# Patient Record
Sex: MALE | Race: WHITE | NOT HISPANIC OR LATINO | Employment: FULL TIME | URBAN - METROPOLITAN AREA
[De-identification: names, ages, dates, MRNs, and addresses within clinical notes are randomized per-mention and may not be internally consistent; named-entity substitution may affect disease eponyms.]

---

## 2017-01-05 RX ORDER — PREGABALIN 225 MG/1
225 CAPSULE ORAL 3 TIMES DAILY
Qty: 90 CAPSULE | Refills: 5 | Status: SHIPPED | OUTPATIENT
Start: 2017-01-05 | End: 2017-07-20 | Stop reason: SDUPTHER

## 2017-01-05 NOTE — TELEPHONE ENCOUNTER
I spoke with the pt he is going to fill out his contract when he picks up his RX  Aldair harvey and in you folder

## 2017-01-25 DIAGNOSIS — F32.A DEPRESSION, UNSPECIFIED DEPRESSION TYPE: ICD-10-CM

## 2017-01-25 RX ORDER — DULOXETIN HYDROCHLORIDE 60 MG/1
60 CAPSULE, DELAYED RELEASE ORAL DAILY
Qty: 90 CAPSULE | Refills: 3 | Status: SHIPPED | OUTPATIENT
Start: 2017-01-25 | End: 2017-12-19 | Stop reason: SDUPTHER

## 2017-02-10 RX ORDER — ATORVASTATIN CALCIUM 20 MG/1
TABLET, FILM COATED ORAL
Qty: 90 TABLET | Refills: 0 | Status: SHIPPED | OUTPATIENT
Start: 2017-02-10 | End: 2017-05-09 | Stop reason: SDUPTHER

## 2017-03-02 ENCOUNTER — OFFICE VISIT (OUTPATIENT)
Dept: INTERNAL MEDICINE | Facility: CLINIC | Age: 52
End: 2017-03-02

## 2017-03-02 VITALS
RESPIRATION RATE: 16 BRPM | BODY MASS INDEX: 32.04 KG/M2 | TEMPERATURE: 97 F | OXYGEN SATURATION: 96 % | SYSTOLIC BLOOD PRESSURE: 130 MMHG | HEART RATE: 106 BPM | WEIGHT: 216.3 LBS | DIASTOLIC BLOOD PRESSURE: 90 MMHG | HEIGHT: 69 IN

## 2017-03-02 DIAGNOSIS — H10.31 ACUTE CONJUNCTIVITIS OF RIGHT EYE, UNSPECIFIED ACUTE CONJUNCTIVITIS TYPE: Primary | ICD-10-CM

## 2017-03-02 PROCEDURE — 99213 OFFICE O/P EST LOW 20 MIN: CPT | Performed by: NURSE PRACTITIONER

## 2017-03-02 RX ORDER — POLYMYXIN B SULFATE AND TRIMETHOPRIM 1; 10000 MG/ML; [USP'U]/ML
1 SOLUTION OPHTHALMIC EVERY 4 HOURS
Qty: 10 EACH | Refills: 0 | Status: SHIPPED | OUTPATIENT
Start: 2017-03-02 | End: 2017-03-14

## 2017-03-02 NOTE — PROGRESS NOTES
Vitals:    03/02/17 1107   BP: 130/90   Pulse: 106   Resp: 16   Temp: 97 °F (36.1 °C)   SpO2: 96%     Last 2 weights    03/02/17  1107   Weight: 216 lb 4.8 oz (98.1 kg)     Social History   Substance Use Topics   • Smoking status: Never Smoker   • Smokeless tobacco: None   • Alcohol use Yes      Comment: rarely       Subjective     HPI  Pt presents to office today with possible pink eye. He states symptoms started about two days ago where he woke up feeling like his eyes were matted, watery, and itchy. When he looked in his mirror he saw his right eye was pretty red and it has progressively gotten worse. Pt also admits to have a cough with some congestion prior to his eye looking like this. His vision gets temporarily blurry due to the increase in lacrimation and thick discharge. Pt denies any eye pain, constant vision change. He has been using some antihistamine drops to try to relief the itching which has helped some but the other symptoms are still present.    The following portions of the patient's history were reviewed and updated as appropriate: allergies, current medications, past medical history, past social history and problem list.    Review of Systems   Constitutional: Negative.    Eyes: Positive for discharge, redness and itching.        Increase in lacrimation, matted eye in morning   Respiratory: Negative.    Cardiovascular: Negative.        Objective     Physical Exam   Constitutional: He is oriented to person, place, and time. He appears well-developed and well-nourished.   HENT:   Head: Normocephalic and atraumatic.   Eyes: EOM and lids are normal. Pupils are equal, round, and reactive to light. Right eye exhibits discharge. Right conjunctiva is injected.   Pt inner left eye exhibits slight redness as well but not completely injected at this time   Neck: Normal range of motion.   Cardiovascular: Normal rate, regular rhythm and normal heart sounds.    Pulmonary/Chest: Effort normal and breath sounds  normal.   Musculoskeletal: Normal range of motion.   Neurological: He is alert and oriented to person, place, and time.   Nursing note and vitals reviewed.      Assessment/Plan   Brady was seen today for conjunctivitis.    Diagnoses and all orders for this visit:    Acute conjunctivitis of right eye, unspecified acute conjunctivitis type    Other orders  -     trimethoprim-polymyxin b (POLYTRIM) 45948-3.1 UNIT/ML-% ophthalmic solution; Administer 1 drop to the right eye Every 4 (Four) Hours.         -Advised pt if left eye starts to become more red to start treating both eyes instead of just right  -good handwashing skills to help prevent the spread to other  -cont home meds  -FU prn or if symptoms persist/worsen

## 2017-03-14 ENCOUNTER — OFFICE VISIT (OUTPATIENT)
Dept: INTERNAL MEDICINE | Facility: CLINIC | Age: 52
End: 2017-03-14

## 2017-03-14 VITALS
DIASTOLIC BLOOD PRESSURE: 62 MMHG | HEART RATE: 116 BPM | RESPIRATION RATE: 16 BRPM | TEMPERATURE: 98.9 F | SYSTOLIC BLOOD PRESSURE: 100 MMHG | BODY MASS INDEX: 31.6 KG/M2 | WEIGHT: 214 LBS

## 2017-03-14 DIAGNOSIS — E78.2 MIXED HYPERLIPIDEMIA: ICD-10-CM

## 2017-03-14 DIAGNOSIS — E11.9 CONTROLLED TYPE 2 DIABETES MELLITUS WITHOUT COMPLICATION, WITHOUT LONG-TERM CURRENT USE OF INSULIN (HCC): ICD-10-CM

## 2017-03-14 DIAGNOSIS — R00.0 TACHYCARDIA WITH HEART RATE 100-120 BEATS PER MINUTE: Primary | ICD-10-CM

## 2017-03-14 DIAGNOSIS — G62.9 PERIPHERAL POLYNEUROPATHY: ICD-10-CM

## 2017-03-14 PROCEDURE — 99214 OFFICE O/P EST MOD 30 MIN: CPT | Performed by: FAMILY MEDICINE

## 2017-03-14 NOTE — PROGRESS NOTES
Subjective   Brady Mota is a 51 y.o. male.     Chief Complaint   Patient presents with   • Diabetes   • Rapid Heart Rate   • Hyperlipidemia         History of Present Illness   Patient returns with history of diabetes type 2 now on metformin 500 mg daily.  His Misbah A1c went up to 10.  He is on atorvastatin as well as Lyrica high-dose 3 times a day for peripheral polyneuropathy.  He has recurrent tachycardia and we discussed follow-up with cardiology as far as his resting tachycardia.  Prior labs in regards to hyperlipidemia reviewed.    The following portions of the patient's history were reviewed and updated as appropriate: allergies, current medications, past social history and problem list.    Review of Systems   Constitutional: Negative.    HENT: Negative.    Eyes: Negative.    Respiratory: Negative.    Cardiovascular: Positive for palpitations.   Gastrointestinal: Negative.    Endocrine: Negative.    Genitourinary: Negative.    Musculoskeletal: Positive for myalgias.   Skin: Negative.    Allergic/Immunologic: Negative.    Neurological: Positive for numbness.   Hematological: Negative.    Psychiatric/Behavioral: Negative.        Objective   Vitals:    03/14/17 1714   BP: 100/62   Pulse: 116   Resp: 16   Temp: 98.9 °F (37.2 °C)     Physical Exam   Constitutional: He is oriented to person, place, and time. He appears well-developed and well-nourished.   HENT:   Head: Normocephalic and atraumatic.   Right Ear: Tympanic membrane and external ear normal.   Left Ear: Tympanic membrane and external ear normal.   Nose: Nose normal.   Mouth/Throat: Oropharynx is clear and moist.   Eyes: Conjunctivae and EOM are normal. Pupils are equal, round, and reactive to light.   Neck: Normal range of motion. Neck supple. No JVD present. No thyromegaly present.   Cardiovascular: Regular rhythm, normal heart sounds and intact distal pulses.  Tachycardia present.    Pulmonary/Chest: Effort normal and breath sounds normal.    Abdominal: Soft. Bowel sounds are normal.   Musculoskeletal: Normal range of motion.   Lymphadenopathy:     He has no cervical adenopathy.   Neurological: He is alert and oriented to person, place, and time. No cranial nerve deficit. Coordination normal.   Skin: Skin is warm and dry. No rash noted.   Psychiatric: He has a normal mood and affect. His behavior is normal. Judgment and thought content normal.   Vitals reviewed.      Assessment/Plan   Problem List Items Addressed This Visit        Cardiovascular and Mediastinum    Hyperlipidemia       Nervous and Auditory    Peripheral neuropathy      Other Visit Diagnoses     Tachycardia with heart rate 100-120 beats per minute    -  Primary    Relevant Orders    Ambulatory Referral to Cardiology    Hemoglobin A1c    Comprehensive Metabolic Panel    Controlled type 2 diabetes mellitus without complication, without long-term current use of insulin        Relevant Medications    metFORMIN (GLUCOPHAGE) 500 MG tablet    Other Relevant Orders    Hemoglobin A1c    Comprehensive Metabolic Panel       increase metformin 500 twice a day.  Recheck labs in 3 months.  Follow-up then.  Referral to cardiology for evaluation of tachycardia.  Continue atorvastatin.  Continue current dose of Lyrica

## 2017-05-09 RX ORDER — ATORVASTATIN CALCIUM 20 MG/1
20 TABLET, FILM COATED ORAL DAILY
Qty: 90 TABLET | Refills: 1 | Status: SHIPPED | OUTPATIENT
Start: 2017-05-09 | End: 2017-11-25 | Stop reason: SDUPTHER

## 2017-05-24 ENCOUNTER — LAB (OUTPATIENT)
Dept: INTERNAL MEDICINE | Facility: CLINIC | Age: 52
End: 2017-05-24

## 2017-05-24 DIAGNOSIS — R00.0 TACHYCARDIA WITH HEART RATE 100-120 BEATS PER MINUTE: ICD-10-CM

## 2017-05-24 DIAGNOSIS — E11.9 CONTROLLED TYPE 2 DIABETES MELLITUS WITHOUT COMPLICATION, WITHOUT LONG-TERM CURRENT USE OF INSULIN (HCC): ICD-10-CM

## 2017-05-25 LAB
ALBUMIN SERPL-MCNC: 4.2 G/DL (ref 3.5–5.2)
ALBUMIN/GLOB SERPL: 1.3 G/DL
ALP SERPL-CCNC: 107 U/L (ref 39–117)
ALT SERPL-CCNC: 175 U/L (ref 1–41)
AST SERPL-CCNC: 192 U/L (ref 1–40)
BILIRUB SERPL-MCNC: 0.5 MG/DL (ref 0.1–1.2)
BUN SERPL-MCNC: 9 MG/DL (ref 6–20)
BUN/CREAT SERPL: 12.2 (ref 7–25)
CALCIUM SERPL-MCNC: 9.1 MG/DL (ref 8.6–10.5)
CHLORIDE SERPL-SCNC: 96 MMOL/L (ref 98–107)
CO2 SERPL-SCNC: 24.4 MMOL/L (ref 22–29)
CREAT SERPL-MCNC: 0.74 MG/DL (ref 0.76–1.27)
GLOBULIN SER CALC-MCNC: 3.2 GM/DL
GLUCOSE SERPL-MCNC: 182 MG/DL (ref 65–99)
HBA1C MFR BLD: 10.5 % (ref 4.8–5.6)
POTASSIUM SERPL-SCNC: 3.9 MMOL/L (ref 3.5–5.2)
PROT SERPL-MCNC: 7.4 G/DL (ref 6–8.5)
SODIUM SERPL-SCNC: 138 MMOL/L (ref 136–145)

## 2017-07-21 RX ORDER — PREGABALIN 225 MG/1
CAPSULE ORAL
Qty: 90 CAPSULE | Refills: 5 | Status: SHIPPED | OUTPATIENT
Start: 2017-07-21 | End: 2017-12-19 | Stop reason: SDUPTHER

## 2017-08-01 ENCOUNTER — OFFICE VISIT (OUTPATIENT)
Dept: INTERNAL MEDICINE | Facility: CLINIC | Age: 52
End: 2017-08-01

## 2017-08-01 VITALS
BODY MASS INDEX: 31.6 KG/M2 | WEIGHT: 214 LBS | DIASTOLIC BLOOD PRESSURE: 88 MMHG | HEART RATE: 113 BPM | OXYGEN SATURATION: 96 % | TEMPERATURE: 97.6 F | SYSTOLIC BLOOD PRESSURE: 128 MMHG

## 2017-08-01 DIAGNOSIS — G62.9 PERIPHERAL POLYNEUROPATHY: ICD-10-CM

## 2017-08-01 DIAGNOSIS — E78.2 MIXED HYPERLIPIDEMIA: Primary | ICD-10-CM

## 2017-08-01 DIAGNOSIS — R74.8 ELEVATED LIVER ENZYMES: ICD-10-CM

## 2017-08-01 DIAGNOSIS — IMO0001 UNCONTROLLED TYPE 2 DIABETES MELLITUS WITHOUT COMPLICATION, WITHOUT LONG-TERM CURRENT USE OF INSULIN: ICD-10-CM

## 2017-08-01 PROCEDURE — 99214 OFFICE O/P EST MOD 30 MIN: CPT | Performed by: FAMILY MEDICINE

## 2017-08-01 RX ORDER — METFORMIN HYDROCHLORIDE EXTENDED-RELEASE TABLETS 1000 MG/1
1000 TABLET, FILM COATED, EXTENDED RELEASE ORAL
Qty: 90 TABLET | Refills: 3 | Status: SHIPPED | OUTPATIENT
Start: 2017-08-01 | End: 2018-02-19 | Stop reason: SDUPTHER

## 2017-08-01 NOTE — PROGRESS NOTES
Subjective   Brady Mota is a 52 y.o. male.     Chief Complaint   Patient presents with   • Diabetes   • Hypertension   • Hyperlipidemia         History of Present Illness   Returns for recheck with a number of risk factors.  Misbah A1c was 10.5 and he has changed his diet to the point of not drinking soft drinks and is now accepted taking metformin 500 twice a day but forgets the second dose quite frequently we discussed using extended-release metformin which will give.  Otherwise preceding this he had evidence of an idiopathic progressive polyneuropathy which is painful requiring him to be on Lyrica.  This caused weight gain however exacerbating his diabetes type 2.  He also has elevated liver enzymes.      The following portions of the patient's history were reviewed and updated as appropriate: allergies, current medications, past social history and problem list.    Review of Systems   Constitutional: Negative.    HENT: Negative.    Eyes: Negative.    Respiratory: Negative.    Cardiovascular: Negative.    Gastrointestinal: Negative.    Endocrine: Negative.    Genitourinary: Negative.    Musculoskeletal: Negative.    Skin: Negative.    Allergic/Immunologic: Negative.    Neurological: Negative.    Hematological: Negative.    Psychiatric/Behavioral: Negative.        Objective   Vitals:    08/01/17 1632   BP: 128/88   Pulse: 113   Temp: 97.6 °F (36.4 °C)   SpO2: 96%     Physical Exam   Constitutional: He is oriented to person, place, and time. He appears well-developed and well-nourished.   HENT:   Head: Normocephalic and atraumatic.   Right Ear: Tympanic membrane and external ear normal.   Left Ear: Tympanic membrane and external ear normal.   Nose: Nose normal.   Mouth/Throat: Oropharynx is clear and moist.   Eyes: Conjunctivae and EOM are normal. Pupils are equal, round, and reactive to light.   Neck: Normal range of motion. Neck supple. No JVD present. No thyromegaly present.   Cardiovascular: Normal rate,  regular rhythm, normal heart sounds and intact distal pulses.    Pulmonary/Chest: Effort normal and breath sounds normal.   Abdominal: Soft. Bowel sounds are normal.   Musculoskeletal: Normal range of motion.   Lymphadenopathy:     He has no cervical adenopathy.   Neurological: He is alert and oriented to person, place, and time. No cranial nerve deficit. Coordination normal.   Skin: Skin is warm and dry. No rash noted.   Psychiatric: He has a normal mood and affect. His behavior is normal. Judgment and thought content normal.   Vitals reviewed.      Assessment/Plan   Problem List Items Addressed This Visit        Cardiovascular and Mediastinum    Hyperlipidemia - Primary    Relevant Orders    CBC & Differential    Comprehensive Metabolic Panel    Hemoglobin A1c    Lipid Panel With / Chol / HDL Ratio    MicroAlbumin, Urine, Random       Nervous and Auditory    Peripheral neuropathy      Other Visit Diagnoses     Uncontrolled type 2 diabetes mellitus without complication, without long-term current use of insulin        Relevant Medications    metFORMIN (FORTAMET) 1000 MG (OSM) 24 hr tablet    Other Relevant Orders    CBC & Differential    Comprehensive Metabolic Panel    Hemoglobin A1c    Lipid Panel With / Chol / HDL Ratio    MicroAlbumin, Urine, Random    Elevated liver enzymes          Plan: Metformin changed to metformin extended release 24,000 mg once daily.  He'll return for screening labs.  Other medications are continued.  He is a cardiovascular workup for the rapid heartbeat and apparently that is a nonpathologic situation.

## 2017-11-27 RX ORDER — ATORVASTATIN CALCIUM 20 MG/1
TABLET, FILM COATED ORAL
Qty: 90 TABLET | Refills: 1 | Status: SHIPPED | OUTPATIENT
Start: 2017-11-27 | End: 2018-02-22 | Stop reason: SDUPTHER

## 2017-12-01 ENCOUNTER — RESULTS ENCOUNTER (OUTPATIENT)
Dept: INTERNAL MEDICINE | Facility: CLINIC | Age: 52
End: 2017-12-01

## 2017-12-01 DIAGNOSIS — E78.2 MIXED HYPERLIPIDEMIA: ICD-10-CM

## 2017-12-01 DIAGNOSIS — IMO0001 UNCONTROLLED TYPE 2 DIABETES MELLITUS WITHOUT COMPLICATION, WITHOUT LONG-TERM CURRENT USE OF INSULIN: ICD-10-CM

## 2017-12-12 LAB
ALBUMIN SERPL-MCNC: 4.7 G/DL (ref 3.5–5.2)
ALBUMIN/GLOB SERPL: 1.5 G/DL
ALP SERPL-CCNC: 99 U/L (ref 39–117)
ALT SERPL-CCNC: 47 U/L (ref 1–41)
AST SERPL-CCNC: 44 U/L (ref 1–40)
BASOPHILS # BLD AUTO: 0.02 10*3/MM3 (ref 0–0.2)
BASOPHILS NFR BLD AUTO: 0.3 % (ref 0–1.5)
BILIRUB SERPL-MCNC: 0.5 MG/DL (ref 0.1–1.2)
BUN SERPL-MCNC: 14 MG/DL (ref 6–20)
BUN/CREAT SERPL: 16.5 (ref 7–25)
CALCIUM SERPL-MCNC: 9.5 MG/DL (ref 8.6–10.5)
CHLORIDE SERPL-SCNC: 98 MMOL/L (ref 98–107)
CHOLEST SERPL-MCNC: 188 MG/DL (ref 0–200)
CHOLEST/HDLC SERPL: 5.53 {RATIO}
CO2 SERPL-SCNC: 27.2 MMOL/L (ref 22–29)
CREAT SERPL-MCNC: 0.85 MG/DL (ref 0.76–1.27)
EOSINOPHIL # BLD AUTO: 0.23 10*3/MM3 (ref 0–0.7)
EOSINOPHIL NFR BLD AUTO: 3.4 % (ref 0.3–6.2)
ERYTHROCYTE [DISTWIDTH] IN BLOOD BY AUTOMATED COUNT: 12.7 % (ref 11.5–14.5)
GFR SERPLBLD CREATININE-BSD FMLA CKD-EPI: 115 ML/MIN/1.73
GFR SERPLBLD CREATININE-BSD FMLA CKD-EPI: 95 ML/MIN/1.73
GLOBULIN SER CALC-MCNC: 3.2 GM/DL
GLUCOSE SERPL-MCNC: 113 MG/DL (ref 65–99)
HBA1C MFR BLD: 6.53 % (ref 4.8–5.6)
HCT VFR BLD AUTO: 51.5 % (ref 40.4–52.2)
HDLC SERPL-MCNC: 34 MG/DL (ref 40–60)
HGB BLD-MCNC: 17.8 G/DL (ref 13.7–17.6)
IMM GRANULOCYTES # BLD: 0 10*3/MM3 (ref 0–0.03)
IMM GRANULOCYTES NFR BLD: 0 % (ref 0–0.5)
LDLC SERPL CALC-MCNC: 102 MG/DL (ref 0–100)
LYMPHOCYTES # BLD AUTO: 2.02 10*3/MM3 (ref 0.9–4.8)
LYMPHOCYTES NFR BLD AUTO: 29.9 % (ref 19.6–45.3)
MCH RBC QN AUTO: 32.2 PG (ref 27–32.7)
MCHC RBC AUTO-ENTMCNC: 34.6 G/DL (ref 32.6–36.4)
MCV RBC AUTO: 93.3 FL (ref 79.8–96.2)
MONOCYTES # BLD AUTO: 0.49 10*3/MM3 (ref 0.2–1.2)
MONOCYTES NFR BLD AUTO: 7.2 % (ref 5–12)
NEUTROPHILS # BLD AUTO: 4 10*3/MM3 (ref 1.9–8.1)
NEUTROPHILS NFR BLD AUTO: 59.2 % (ref 42.7–76)
PLATELET # BLD AUTO: 192 10*3/MM3 (ref 140–500)
POTASSIUM SERPL-SCNC: 4.9 MMOL/L (ref 3.5–5.2)
PROT SERPL-MCNC: 7.9 G/DL (ref 6–8.5)
RBC # BLD AUTO: 5.52 10*6/MM3 (ref 4.6–6)
SODIUM SERPL-SCNC: 143 MMOL/L (ref 136–145)
TRIGL SERPL-MCNC: 260 MG/DL (ref 0–150)
VLDLC SERPL CALC-MCNC: 52 MG/DL (ref 5–40)
WBC # BLD AUTO: 6.76 10*3/MM3 (ref 4.5–10.7)

## 2017-12-19 ENCOUNTER — OFFICE VISIT (OUTPATIENT)
Dept: INTERNAL MEDICINE | Facility: CLINIC | Age: 52
End: 2017-12-19

## 2017-12-19 VITALS
BODY MASS INDEX: 31.01 KG/M2 | HEART RATE: 109 BPM | TEMPERATURE: 97.7 F | OXYGEN SATURATION: 97 % | DIASTOLIC BLOOD PRESSURE: 80 MMHG | WEIGHT: 210 LBS | SYSTOLIC BLOOD PRESSURE: 112 MMHG

## 2017-12-19 DIAGNOSIS — E78.2 MIXED HYPERLIPIDEMIA: Primary | ICD-10-CM

## 2017-12-19 DIAGNOSIS — F32.A DEPRESSION, UNSPECIFIED DEPRESSION TYPE: ICD-10-CM

## 2017-12-19 DIAGNOSIS — G62.9 PERIPHERAL POLYNEUROPATHY: ICD-10-CM

## 2017-12-19 DIAGNOSIS — IMO0001 UNCONTROLLED TYPE 2 DIABETES MELLITUS WITHOUT COMPLICATION, WITHOUT LONG-TERM CURRENT USE OF INSULIN: ICD-10-CM

## 2017-12-19 PROCEDURE — 99214 OFFICE O/P EST MOD 30 MIN: CPT | Performed by: FAMILY MEDICINE

## 2017-12-19 RX ORDER — DULOXETIN HYDROCHLORIDE 60 MG/1
60 CAPSULE, DELAYED RELEASE ORAL DAILY
Qty: 90 CAPSULE | Refills: 3 | Status: SHIPPED | OUTPATIENT
Start: 2017-12-19 | End: 2018-02-22 | Stop reason: SDUPTHER

## 2017-12-19 RX ORDER — PREGABALIN 225 MG/1
225 CAPSULE ORAL DAILY
Qty: 90 CAPSULE | Refills: 5 | Status: SHIPPED | OUTPATIENT
Start: 2017-12-19 | End: 2018-02-01 | Stop reason: SDUPTHER

## 2017-12-19 NOTE — PROGRESS NOTES
Subjective   Brady Mota is a 52 y.o. male.     Chief Complaint   Patient presents with   • Hyperlipidemia   • Hypertension   • GI Problem   • Diabetes         History of Present Illness   Patient with type 2 diabetes and diabetic neuropathy with progressive neuropathy being treated by podiatry for an ulceration on the left foot this with Dr. Thomas.  His labs are markedly better after getting off regular Coca-Cola 4 times a day.  He is now Coca-Cola 0.    History of hypertension intention hyperlipidemia are reviewed as well as diet and diabetes.  His liver functions were improved.      The following portions of the patient's history were reviewed and updated as appropriate: allergies, current medications, past social history and problem list.    Review of Systems   Constitutional: Negative.    HENT: Negative.    Eyes: Negative.    Respiratory: Negative.    Cardiovascular: Negative.    Gastrointestinal: Negative.    Endocrine: Negative.    Genitourinary: Negative.    Musculoskeletal: Negative.    Skin: Negative.    Allergic/Immunologic: Negative.    Neurological: Negative.    Hematological: Negative.    Psychiatric/Behavioral: Negative.        Objective   Vitals:    12/19/17 1645   BP: 112/80   Pulse: 109   Temp: 97.7 °F (36.5 °C)   SpO2: 97%     Physical Exam   Constitutional: He is oriented to person, place, and time. He appears well-developed and well-nourished.   HENT:   Head: Normocephalic and atraumatic.   Right Ear: Tympanic membrane and external ear normal.   Left Ear: Tympanic membrane and external ear normal.   Nose: Nose normal.   Mouth/Throat: Oropharynx is clear and moist.   Eyes: Conjunctivae and EOM are normal. Pupils are equal, round, and reactive to light.   Neck: Normal range of motion. Neck supple. No JVD present. No thyromegaly present.   Cardiovascular: Normal rate, regular rhythm, normal heart sounds and intact distal pulses.    Pulmonary/Chest: Effort normal and breath sounds normal.    Abdominal: Soft. Bowel sounds are normal.   Musculoskeletal: Normal range of motion.   Lymphadenopathy:     He has no cervical adenopathy.   Neurological: He is alert and oriented to person, place, and time. A sensory deficit is present. No cranial nerve deficit. Coordination normal.   Skin: Skin is warm and dry. No rash noted.   Psychiatric: He has a normal mood and affect. His behavior is normal. Judgment and thought content normal.   Vitals reviewed.      Assessment/Plan   Problem List Items Addressed This Visit        Cardiovascular and Mediastinum    Hyperlipidemia - Primary    Relevant Orders    CBC & Differential    Comprehensive Metabolic Panel    Hemoglobin A1c    Lipid Panel With / Chol / HDL Ratio    Urinalysis With / Microscopic If Indicated - Urine, Clean Catch    Vitamin B12    MicroAlbumin, Urine, Random - Urine, Clean Catch       Endocrine    Uncontrolled type 2 diabetes mellitus without complication, without long-term current use of insulin    Relevant Orders    CBC & Differential    Comprehensive Metabolic Panel    Hemoglobin A1c    Lipid Panel With / Chol / HDL Ratio    Urinalysis With / Microscopic If Indicated - Urine, Clean Catch    Vitamin B12    MicroAlbumin, Urine, Random - Urine, Clean Catch       Nervous and Auditory    Peripheral neuropathy    Relevant Orders    CBC & Differential    Comprehensive Metabolic Panel    Hemoglobin A1c    Lipid Panel With / Chol / HDL Ratio    Urinalysis With / Microscopic If Indicated - Urine, Clean Catch    Vitamin B12    MicroAlbumin, Urine, Random - Urine, Clean Catch      Other Visit Diagnoses     Depression, unspecified depression type        Relevant Medications    DULoxetine (CYMBALTA) 60 MG capsule      Plan: Meds remain the same.  Recheck labs 6 months.  Continue diet as he is done with improvement.  Follow-up with podiatry.

## 2018-02-01 ENCOUNTER — TELEPHONE (OUTPATIENT)
Dept: INTERNAL MEDICINE | Facility: CLINIC | Age: 53
End: 2018-02-01

## 2018-02-01 RX ORDER — PREGABALIN 225 MG/1
225 CAPSULE ORAL 3 TIMES DAILY
Qty: 90 CAPSULE | Refills: 5 | Status: SHIPPED | OUTPATIENT
Start: 2018-02-01 | End: 2018-06-19 | Stop reason: SDUPTHER

## 2018-02-01 NOTE — TELEPHONE ENCOUNTER
He is correct.  Please change his current Lyrica dose to 220 mg 3 times a day.  He will need a new prescription.

## 2018-02-19 RX ORDER — METFORMIN HYDROCHLORIDE EXTENDED-RELEASE TABLETS 1000 MG/1
1000 TABLET, FILM COATED, EXTENDED RELEASE ORAL
Qty: 90 TABLET | Refills: 1 | Status: SHIPPED | OUTPATIENT
Start: 2018-02-19 | End: 2018-02-22 | Stop reason: CLARIF

## 2018-02-22 DIAGNOSIS — F32.A DEPRESSION, UNSPECIFIED DEPRESSION TYPE: ICD-10-CM

## 2018-02-22 RX ORDER — ATORVASTATIN CALCIUM 20 MG/1
20 TABLET, FILM COATED ORAL NIGHTLY
Qty: 90 TABLET | Refills: 1 | Status: SHIPPED | OUTPATIENT
Start: 2018-02-22 | End: 2018-02-26 | Stop reason: SDUPTHER

## 2018-02-22 RX ORDER — METFORMIN HYDROCHLORIDE 500 MG/1
500 TABLET, EXTENDED RELEASE ORAL 2 TIMES DAILY
Qty: 180 TABLET | Refills: 1 | Status: SHIPPED | OUTPATIENT
Start: 2018-02-22 | End: 2018-02-26 | Stop reason: SDUPTHER

## 2018-02-22 RX ORDER — DULOXETIN HYDROCHLORIDE 60 MG/1
60 CAPSULE, DELAYED RELEASE ORAL DAILY
Qty: 90 CAPSULE | Refills: 1 | Status: SHIPPED | OUTPATIENT
Start: 2018-02-22 | End: 2018-02-26 | Stop reason: SDUPTHER

## 2018-02-22 RX ORDER — METFORMIN HYDROCHLORIDE 1000 MG/1
1000 TABLET, FILM COATED, EXTENDED RELEASE ORAL
Qty: 90 TABLET | Refills: 1 | Status: SHIPPED | OUTPATIENT
Start: 2018-02-22 | End: 2018-02-22 | Stop reason: DRUGHIGH

## 2018-02-26 DIAGNOSIS — F32.A DEPRESSION, UNSPECIFIED DEPRESSION TYPE: ICD-10-CM

## 2018-02-26 RX ORDER — ATORVASTATIN CALCIUM 20 MG/1
20 TABLET, FILM COATED ORAL NIGHTLY
Qty: 90 TABLET | Refills: 1 | Status: SHIPPED | OUTPATIENT
Start: 2018-02-26 | End: 2018-08-24 | Stop reason: SDUPTHER

## 2018-02-26 RX ORDER — METFORMIN HYDROCHLORIDE 500 MG/1
500 TABLET, EXTENDED RELEASE ORAL 2 TIMES DAILY
Qty: 180 TABLET | Refills: 1 | Status: SHIPPED | OUTPATIENT
Start: 2018-02-26 | End: 2018-08-31 | Stop reason: SDUPTHER

## 2018-02-26 RX ORDER — DULOXETIN HYDROCHLORIDE 60 MG/1
60 CAPSULE, DELAYED RELEASE ORAL DAILY
Qty: 90 CAPSULE | Refills: 1 | Status: SHIPPED | OUTPATIENT
Start: 2018-02-26 | End: 2018-08-31 | Stop reason: SDUPTHER

## 2018-06-01 ENCOUNTER — RESULTS ENCOUNTER (OUTPATIENT)
Dept: INTERNAL MEDICINE | Facility: CLINIC | Age: 53
End: 2018-06-01

## 2018-06-01 DIAGNOSIS — G62.9 PERIPHERAL POLYNEUROPATHY: ICD-10-CM

## 2018-06-01 DIAGNOSIS — IMO0001 UNCONTROLLED TYPE 2 DIABETES MELLITUS WITHOUT COMPLICATION, WITHOUT LONG-TERM CURRENT USE OF INSULIN: ICD-10-CM

## 2018-06-01 DIAGNOSIS — E78.2 MIXED HYPERLIPIDEMIA: ICD-10-CM

## 2018-06-12 LAB
ALBUMIN SERPL-MCNC: 4.4 G/DL (ref 3.5–5.2)
ALBUMIN/GLOB SERPL: 1.6 G/DL
ALP SERPL-CCNC: 97 U/L (ref 39–117)
ALT SERPL-CCNC: 24 U/L (ref 1–41)
AST SERPL-CCNC: 20 U/L (ref 1–40)
BASOPHILS # BLD AUTO: 0.02 10*3/MM3 (ref 0–0.2)
BASOPHILS NFR BLD AUTO: 0.3 % (ref 0–1.5)
BILIRUB SERPL-MCNC: 0.5 MG/DL (ref 0.1–1.2)
BUN SERPL-MCNC: 14 MG/DL (ref 6–20)
BUN/CREAT SERPL: 17.3 (ref 7–25)
CALCIUM SERPL-MCNC: 9.5 MG/DL (ref 8.6–10.5)
CHLORIDE SERPL-SCNC: 96 MMOL/L (ref 98–107)
CHOLEST SERPL-MCNC: 175 MG/DL (ref 0–200)
CHOLEST/HDLC SERPL: 5 {RATIO}
CO2 SERPL-SCNC: 26.5 MMOL/L (ref 22–29)
CREAT SERPL-MCNC: 0.81 MG/DL (ref 0.76–1.27)
EOSINOPHIL # BLD AUTO: 0.24 10*3/MM3 (ref 0–0.7)
EOSINOPHIL NFR BLD AUTO: 3.6 % (ref 0.3–6.2)
ERYTHROCYTE [DISTWIDTH] IN BLOOD BY AUTOMATED COUNT: 12.6 % (ref 11.5–14.5)
GFR SERPLBLD CREATININE-BSD FMLA CKD-EPI: 100 ML/MIN/1.73
GFR SERPLBLD CREATININE-BSD FMLA CKD-EPI: 121 ML/MIN/1.73
GLOBULIN SER CALC-MCNC: 2.8 GM/DL
GLUCOSE SERPL-MCNC: 116 MG/DL (ref 65–99)
HBA1C MFR BLD: 6.56 % (ref 4.8–5.6)
HCT VFR BLD AUTO: 50.9 % (ref 40.4–52.2)
HDLC SERPL-MCNC: 35 MG/DL (ref 40–60)
HGB BLD-MCNC: 17.2 G/DL (ref 13.7–17.6)
IMM GRANULOCYTES # BLD: 0.01 10*3/MM3 (ref 0–0.03)
IMM GRANULOCYTES NFR BLD: 0.2 % (ref 0–0.5)
LDLC SERPL CALC-MCNC: 98 MG/DL (ref 0–100)
LYMPHOCYTES # BLD AUTO: 1.57 10*3/MM3 (ref 0.9–4.8)
LYMPHOCYTES NFR BLD AUTO: 23.6 % (ref 19.6–45.3)
MCH RBC QN AUTO: 30.7 PG (ref 27–32.7)
MCHC RBC AUTO-ENTMCNC: 33.8 G/DL (ref 32.6–36.4)
MCV RBC AUTO: 90.7 FL (ref 79.8–96.2)
MONOCYTES # BLD AUTO: 0.5 10*3/MM3 (ref 0.2–1.2)
MONOCYTES NFR BLD AUTO: 7.5 % (ref 5–12)
NEUTROPHILS # BLD AUTO: 4.33 10*3/MM3 (ref 1.9–8.1)
NEUTROPHILS NFR BLD AUTO: 65 % (ref 42.7–76)
PLATELET # BLD AUTO: 170 10*3/MM3 (ref 140–500)
POTASSIUM SERPL-SCNC: 4.9 MMOL/L (ref 3.5–5.2)
PROT SERPL-MCNC: 7.2 G/DL (ref 6–8.5)
RBC # BLD AUTO: 5.61 10*6/MM3 (ref 4.6–6)
SODIUM SERPL-SCNC: 139 MMOL/L (ref 136–145)
TRIGL SERPL-MCNC: 212 MG/DL (ref 0–150)
UNABLE TO VOID: NORMAL
VIT B12 SERPL-MCNC: 583 PG/ML (ref 211–946)
VLDLC SERPL CALC-MCNC: 42.4 MG/DL (ref 5–40)
WBC # BLD AUTO: 6.66 10*3/MM3 (ref 4.5–10.7)

## 2018-06-19 ENCOUNTER — OFFICE VISIT (OUTPATIENT)
Dept: INTERNAL MEDICINE | Facility: CLINIC | Age: 53
End: 2018-06-19

## 2018-06-19 VITALS
OXYGEN SATURATION: 97 % | HEART RATE: 100 BPM | BODY MASS INDEX: 31.6 KG/M2 | TEMPERATURE: 97.1 F | SYSTOLIC BLOOD PRESSURE: 128 MMHG | DIASTOLIC BLOOD PRESSURE: 88 MMHG | WEIGHT: 214 LBS

## 2018-06-19 DIAGNOSIS — E78.2 MIXED HYPERLIPIDEMIA: ICD-10-CM

## 2018-06-19 DIAGNOSIS — IMO0001 UNCONTROLLED TYPE 2 DIABETES MELLITUS WITHOUT COMPLICATION, WITHOUT LONG-TERM CURRENT USE OF INSULIN: Primary | ICD-10-CM

## 2018-06-19 DIAGNOSIS — G59 MONONEUROPATHY DUE TO UNDERLYING DISEASE: ICD-10-CM

## 2018-06-19 PROCEDURE — 99214 OFFICE O/P EST MOD 30 MIN: CPT | Performed by: FAMILY MEDICINE

## 2018-06-19 RX ORDER — PREGABALIN 225 MG/1
225 CAPSULE ORAL 3 TIMES DAILY
Qty: 90 CAPSULE | Refills: 5 | Status: SHIPPED | OUTPATIENT
Start: 2018-06-19 | End: 2018-12-22 | Stop reason: SDUPTHER

## 2018-06-19 RX ORDER — FLUTICASONE PROPIONATE 50 MCG
2 SPRAY, SUSPENSION (ML) NASAL DAILY
Qty: 48 G | Refills: 3 | Status: SHIPPED | OUTPATIENT
Start: 2018-06-19 | End: 2021-04-19 | Stop reason: SDUPTHER

## 2018-06-19 NOTE — PROGRESS NOTES
Subjective   Brady Mota is a 52 y.o. male.     Chief Complaint   Patient presents with   • Diabetes   • Hyperlipidemia   Neuropathy  History of Present Illness   Amol is here for recheck of labs for type 2 diabetes hypertriglyceridemia hyperlipidemia.  He is overall improved in all this and stopping Coca-Cola and starting metformin.  His progress is remarkable.  He is trying to do overall better.  He has a slowly healing ulcer on the right foot which is treated by surgery at [facility].  This is doing better as well.  It sounds like his neuropathy is improved.  Labs are reviewed with him and results are given to him.      The following portions of the patient's history were reviewed and updated as appropriate: allergies, current medications, past social history and problem list.    Review of Systems   Constitutional: Negative.    HENT: Negative.    Eyes: Negative.    Respiratory: Negative.    Cardiovascular: Negative.    Endocrine: Negative.    Genitourinary: Negative.    Musculoskeletal: Negative.    Skin: Positive for wound.   Allergic/Immunologic: Negative.    Neurological: Positive for numbness.   Hematological: Negative.    Psychiatric/Behavioral: Negative.        Objective   Vitals:    06/19/18 1721   BP: 128/88   Pulse: 100   Temp: 97.1 °F (36.2 °C)   SpO2: 97%     Physical Exam   Constitutional: He is oriented to person, place, and time. He appears well-developed.   HENT:   Head: Normocephalic.   Right Ear: External ear normal.   Left Ear: External ear normal.   Mouth/Throat: Oropharynx is clear and moist.   Eyes: Pupils are equal, round, and reactive to light.   Neck: Normal range of motion. Neck supple.   Cardiovascular: Normal rate, regular rhythm and normal heart sounds.    Pulmonary/Chest: Effort normal and breath sounds normal.   Abdominal: Soft. Bowel sounds are normal.   Musculoskeletal: Normal range of motion.        Neurological: He is alert and oriented to person, place, and time.    Skin: Skin is warm and dry.   Psychiatric: He has a normal mood and affect.   Nursing note and vitals reviewed.      Assessment/Plan   Problem List Items Addressed This Visit        Cardiovascular and Mediastinum    Hyperlipidemia    Relevant Orders    CBC & Differential    Comprehensive Metabolic Panel    Hemoglobin A1c    Lipid Panel With / Chol / HDL Ratio    MicroAlbumin, Urine, Random - Urine, Clean Catch       Endocrine    Uncontrolled type 2 diabetes mellitus without complication, without long-term current use of insulin - Primary    Relevant Orders    CBC & Differential    Comprehensive Metabolic Panel    Hemoglobin A1c    Lipid Panel With / Chol / HDL Ratio    MicroAlbumin, Urine, Random - Urine, Clean Catch       Nervous and Auditory    Peripheral neuropathy    Relevant Orders    CBC & Differential    Comprehensive Metabolic Panel    Hemoglobin A1c    Lipid Panel With / Chol / HDL Ratio    MicroAlbumin, Urine, Random - Urine, Clean Catch      Medications remain the same with labs preceding visit in 6 months.  Follow-up with wound care at [facility]

## 2018-08-24 RX ORDER — ATORVASTATIN CALCIUM 20 MG/1
TABLET, FILM COATED ORAL
Qty: 90 TABLET | Refills: 1 | Status: SHIPPED | OUTPATIENT
Start: 2018-08-24 | End: 2019-02-20 | Stop reason: SDUPTHER

## 2018-08-31 DIAGNOSIS — F32.A DEPRESSION, UNSPECIFIED DEPRESSION TYPE: ICD-10-CM

## 2018-08-31 RX ORDER — METFORMIN HYDROCHLORIDE 500 MG/1
TABLET, EXTENDED RELEASE ORAL
Qty: 160 TABLET | Refills: 1 | Status: SHIPPED | OUTPATIENT
Start: 2018-08-31 | End: 2018-12-10 | Stop reason: DRUGHIGH

## 2018-08-31 RX ORDER — DULOXETIN HYDROCHLORIDE 60 MG/1
CAPSULE, DELAYED RELEASE ORAL
Qty: 80 CAPSULE | Refills: 1 | Status: SHIPPED | OUTPATIENT
Start: 2018-08-31 | End: 2019-04-18 | Stop reason: SDUPTHER

## 2018-12-06 ENCOUNTER — LAB (OUTPATIENT)
Dept: INTERNAL MEDICINE | Facility: CLINIC | Age: 53
End: 2018-12-06

## 2018-12-06 DIAGNOSIS — E78.2 MIXED HYPERLIPIDEMIA: ICD-10-CM

## 2018-12-06 DIAGNOSIS — G59 MONONEUROPATHY DUE TO UNDERLYING DISEASE: ICD-10-CM

## 2018-12-06 DIAGNOSIS — IMO0001 UNCONTROLLED TYPE 2 DIABETES MELLITUS WITHOUT COMPLICATION, WITHOUT LONG-TERM CURRENT USE OF INSULIN: ICD-10-CM

## 2018-12-07 LAB
ALBUMIN SERPL-MCNC: 4.8 G/DL (ref 3.5–5.2)
ALBUMIN/GLOB SERPL: 1.7 G/DL
ALP SERPL-CCNC: 95 U/L (ref 39–117)
ALT SERPL-CCNC: 83 U/L (ref 1–41)
AST SERPL-CCNC: 97 U/L (ref 1–40)
BASOPHILS # BLD AUTO: 0.04 10*3/MM3 (ref 0–0.2)
BASOPHILS NFR BLD AUTO: 0.7 % (ref 0–1.5)
BILIRUB SERPL-MCNC: 0.6 MG/DL (ref 0.1–1.2)
BUN SERPL-MCNC: 11 MG/DL (ref 6–20)
BUN/CREAT SERPL: 14.9 (ref 7–25)
CALCIUM SERPL-MCNC: 9.3 MG/DL (ref 8.6–10.5)
CHLORIDE SERPL-SCNC: 99 MMOL/L (ref 98–107)
CHOLEST SERPL-MCNC: 196 MG/DL (ref 0–200)
CHOLEST/HDLC SERPL: 5.16 {RATIO}
CO2 SERPL-SCNC: 26 MMOL/L (ref 22–29)
CREAT SERPL-MCNC: 0.74 MG/DL (ref 0.76–1.27)
EOSINOPHIL # BLD AUTO: 0.2 10*3/MM3 (ref 0–0.7)
EOSINOPHIL NFR BLD AUTO: 3.3 % (ref 0.3–6.2)
ERYTHROCYTE [DISTWIDTH] IN BLOOD BY AUTOMATED COUNT: 13.2 % (ref 11.5–14.5)
GLOBULIN SER CALC-MCNC: 2.8 GM/DL
GLUCOSE SERPL-MCNC: 142 MG/DL (ref 65–99)
HBA1C MFR BLD: 7.4 % (ref 4.8–5.6)
HCT VFR BLD AUTO: 53.9 % (ref 40.4–52.2)
HDLC SERPL-MCNC: 38 MG/DL (ref 40–60)
HGB BLD-MCNC: 17.4 G/DL (ref 13.7–17.6)
IMM GRANULOCYTES # BLD: 0 10*3/MM3 (ref 0–0.03)
IMM GRANULOCYTES NFR BLD: 0 % (ref 0–0.5)
LDLC SERPL CALC-MCNC: 99 MG/DL (ref 0–100)
LYMPHOCYTES # BLD AUTO: 1.73 10*3/MM3 (ref 0.9–4.8)
LYMPHOCYTES NFR BLD AUTO: 28.8 % (ref 19.6–45.3)
MCH RBC QN AUTO: 30.5 PG (ref 27–32.7)
MCHC RBC AUTO-ENTMCNC: 32.3 G/DL (ref 32.6–36.4)
MCV RBC AUTO: 94.4 FL (ref 79.8–96.2)
MONOCYTES # BLD AUTO: 0.51 10*3/MM3 (ref 0.2–1.2)
MONOCYTES NFR BLD AUTO: 8.5 % (ref 5–12)
NEUTROPHILS # BLD AUTO: 3.52 10*3/MM3 (ref 1.9–8.1)
NEUTROPHILS NFR BLD AUTO: 58.7 % (ref 42.7–76)
PLATELET # BLD AUTO: 187 10*3/MM3 (ref 140–500)
POTASSIUM SERPL-SCNC: 5 MMOL/L (ref 3.5–5.2)
PROT SERPL-MCNC: 7.6 G/DL (ref 6–8.5)
RBC # BLD AUTO: 5.71 10*6/MM3 (ref 4.6–6)
SODIUM SERPL-SCNC: 141 MMOL/L (ref 136–145)
TRIGL SERPL-MCNC: 294 MG/DL (ref 0–150)
VLDLC SERPL CALC-MCNC: 58.8 MG/DL (ref 5–40)
WBC # BLD AUTO: 6 10*3/MM3 (ref 4.5–10.7)

## 2018-12-10 ENCOUNTER — OFFICE VISIT (OUTPATIENT)
Dept: INTERNAL MEDICINE | Facility: CLINIC | Age: 53
End: 2018-12-10

## 2018-12-10 VITALS
HEART RATE: 114 BPM | OXYGEN SATURATION: 93 % | DIASTOLIC BLOOD PRESSURE: 82 MMHG | SYSTOLIC BLOOD PRESSURE: 134 MMHG | TEMPERATURE: 97.2 F | BODY MASS INDEX: 32.19 KG/M2 | WEIGHT: 218 LBS

## 2018-12-10 DIAGNOSIS — E78.2 MIXED HYPERLIPIDEMIA: ICD-10-CM

## 2018-12-10 DIAGNOSIS — G62.9 POLYNEUROPATHY: ICD-10-CM

## 2018-12-10 DIAGNOSIS — IMO0001 UNCONTROLLED TYPE 2 DIABETES MELLITUS WITHOUT COMPLICATION, WITHOUT LONG-TERM CURRENT USE OF INSULIN: ICD-10-CM

## 2018-12-10 DIAGNOSIS — R22.1 MASS OF RIGHT SIDE OF NECK: Primary | ICD-10-CM

## 2018-12-10 DIAGNOSIS — G59 MONONEUROPATHY DUE TO UNDERLYING DISEASE: ICD-10-CM

## 2018-12-10 DIAGNOSIS — R74.8 ABNORMAL LIVER ENZYMES: ICD-10-CM

## 2018-12-10 PROCEDURE — 99214 OFFICE O/P EST MOD 30 MIN: CPT | Performed by: FAMILY MEDICINE

## 2018-12-10 RX ORDER — METFORMIN HYDROCHLORIDE EXTENDED-RELEASE TABLETS 1000 MG/1
1000 TABLET, FILM COATED, EXTENDED RELEASE ORAL 2 TIMES DAILY WITH MEALS
Qty: 180 TABLET | Refills: 1 | Status: SHIPPED | OUTPATIENT
Start: 2018-12-10 | End: 2019-06-28 | Stop reason: SDUPTHER

## 2018-12-10 NOTE — PROGRESS NOTES
Subjective   Brady Mota is a 53 y.o. male.     Chief Complaint   Patient presents with   • Diabetes   • GI Problem   • Hyperlipidemia         History of Present Illness   Amol returns for recheck he is A1c went up 7.6 he got off his diet.  We discussed increasing metformin XL R 2000 twice a day.  He has evidence of fatty liver in relation to elevated sugar and triglycerides.  He has neuropathy in hands and feet.  He is to reinitiate his obstructive sleep apnea device once he gets moves.  We discussed getting an ultrasound of his liver based on liver enzymes going up and down probable fatty liver.  We discussed eventual shingles X vaccine.  There is evidence of probable salivary gland the right side of the trachea in the anterior right neck.  Zetia history of removal of a gland in the area before.  We'll see him back to Dr. Mann.    The following portions of the patient's history were reviewed and updated as appropriate: allergies, current medications, past social history and problem list.    Review of Systems   Constitutional: Positive for fatigue.   HENT: Negative.    Eyes: Negative.    Respiratory: Negative.    Cardiovascular: Negative.    Gastrointestinal: Negative.    Endocrine: Negative.    Genitourinary: Negative.    Musculoskeletal: Positive for arthralgias.   Skin: Negative.    Allergic/Immunologic: Negative.    Neurological: Positive for numbness.   Hematological: Negative.    Psychiatric/Behavioral: Negative.        Objective   Vitals:    12/10/18 1539   BP: 134/82   Pulse: 114   Temp: 97.2 °F (36.2 °C)   SpO2: 93%     Physical Exam   Constitutional: He is oriented to person, place, and time. He appears well-developed and well-nourished.   HENT:   Head: Normocephalic and atraumatic.   Right Ear: Tympanic membrane and external ear normal.   Left Ear: Tympanic membrane and external ear normal.   Nose: Nose normal.   Mouth/Throat: Oropharynx is clear and moist.   Eyes: Conjunctivae and EOM are normal.  Pupils are equal, round, and reactive to light.   Neck: Normal range of motion. Neck supple. No JVD present. No thyromegaly present.       Cardiovascular: Normal rate, regular rhythm, normal heart sounds and intact distal pulses.   Pulmonary/Chest: Effort normal and breath sounds normal.   Abdominal: Soft. Bowel sounds are normal.   Musculoskeletal: Normal range of motion.   Lymphadenopathy:     He has no cervical adenopathy.   Neurological: He is alert and oriented to person, place, and time. A sensory deficit is present. No cranial nerve deficit. Coordination normal.   Skin: Skin is warm and dry. No rash noted.   Psychiatric: He has a normal mood and affect. His behavior is normal. Judgment and thought content normal.   Vitals reviewed.      Assessment/Plan   Problem List Items Addressed This Visit        Cardiovascular and Mediastinum    Hyperlipidemia    Relevant Orders    CBC & Differential    Comprehensive Metabolic Panel    Hemoglobin A1c    Lipid Panel With / Chol / HDL Ratio    MicroAlbumin, Urine, Random - Urine, Clean Catch       Endocrine    Uncontrolled type 2 diabetes mellitus without complication, without long-term current use of insulin (CMS/[facility])    Relevant Medications    metFORMIN (FORTAMET) 1000 MG (OSM) 24 hr tablet    Other Relevant Orders    CBC & Differential    Comprehensive Metabolic Panel    Hemoglobin A1c    Lipid Panel With / Chol / HDL Ratio    MicroAlbumin, Urine, Random - Urine, Clean Catch       Nervous and Auditory    Polyneuropathy    Relevant Orders    CBC & Differential    Comprehensive Metabolic Panel    Hemoglobin A1c    Lipid Panel With / Chol / HDL Ratio    MicroAlbumin, Urine, Random - Urine, Clean Catch       Other    Mass of right side of neck - Primary    Relevant Orders    Ambulatory Referral to ENT (Otolaryngology)      Other Visit Diagnoses     Abnormal liver enzymes        Relevant Orders    US Abdomen Complete    CBC & Differential    Comprehensive Metabolic Panel     Hemoglobin A1c    Lipid Panel With / Chol / HDL Ratio    MicroAlbumin, Urine, Random - Urine, Clean Catch        increase metformin thousand XL twice a day.  Referral to ENT for mass in the right side of the neck.    Labs for visit in 6 months.  Ultrasound of the liver at [facility].

## 2018-12-24 RX ORDER — PREGABALIN 225 MG/1
CAPSULE ORAL
Qty: 90 CAPSULE | Refills: 0 | Status: SHIPPED | OUTPATIENT
Start: 2018-12-24 | End: 2019-01-28 | Stop reason: SDUPTHER

## 2019-01-28 RX ORDER — PREGABALIN 225 MG/1
225 CAPSULE ORAL 3 TIMES DAILY
Qty: 90 CAPSULE | Refills: 3 | Status: SHIPPED | OUTPATIENT
Start: 2019-01-28 | End: 2019-06-04 | Stop reason: SDUPTHER

## 2019-02-20 RX ORDER — ATORVASTATIN CALCIUM 20 MG/1
TABLET, FILM COATED ORAL
Qty: 90 TABLET | Refills: 1 | Status: SHIPPED | OUTPATIENT
Start: 2019-02-20 | End: 2019-08-09 | Stop reason: SDUPTHER

## 2019-04-18 DIAGNOSIS — F32.A DEPRESSION, UNSPECIFIED DEPRESSION TYPE: ICD-10-CM

## 2019-04-18 RX ORDER — DULOXETIN HYDROCHLORIDE 60 MG/1
60 CAPSULE, DELAYED RELEASE ORAL DAILY
Qty: 90 CAPSULE | Refills: 1 | Status: SHIPPED | OUTPATIENT
Start: 2019-04-18 | End: 2019-06-10 | Stop reason: SDUPTHER

## 2019-06-04 LAB
ALBUMIN SERPL-MCNC: 4.7 G/DL (ref 3.5–5.2)
ALBUMIN/GLOB SERPL: 1.8 G/DL
ALP SERPL-CCNC: 89 U/L (ref 39–117)
ALT SERPL-CCNC: 71 U/L (ref 1–41)
AST SERPL-CCNC: 59 U/L (ref 1–40)
BASOPHILS # BLD AUTO: 0.03 10*3/MM3 (ref 0–0.2)
BASOPHILS NFR BLD AUTO: 0.5 % (ref 0–1.5)
BILIRUB SERPL-MCNC: 0.4 MG/DL (ref 0.2–1.2)
BUN SERPL-MCNC: 13 MG/DL (ref 6–20)
BUN/CREAT SERPL: 17.6 (ref 7–25)
CALCIUM SERPL-MCNC: 9.9 MG/DL (ref 8.6–10.5)
CHLORIDE SERPL-SCNC: 98 MMOL/L (ref 98–107)
CHOLEST SERPL-MCNC: 175 MG/DL (ref 0–200)
CHOLEST/HDLC SERPL: 4.73 {RATIO}
CO2 SERPL-SCNC: 26.3 MMOL/L (ref 22–29)
CREAT SERPL-MCNC: 0.74 MG/DL (ref 0.76–1.27)
EOSINOPHIL # BLD AUTO: 0.18 10*3/MM3 (ref 0–0.4)
EOSINOPHIL NFR BLD AUTO: 3.3 % (ref 0.3–6.2)
ERYTHROCYTE [DISTWIDTH] IN BLOOD BY AUTOMATED COUNT: 12.8 % (ref 12.3–15.4)
GLOBULIN SER CALC-MCNC: 2.6 GM/DL
GLUCOSE SERPL-MCNC: 181 MG/DL (ref 65–99)
HBA1C MFR BLD: 7.7 % (ref 4.8–5.6)
HCT VFR BLD AUTO: 54.5 % (ref 37.5–51)
HDLC SERPL-MCNC: 37 MG/DL (ref 40–60)
HGB BLD-MCNC: 17.4 G/DL (ref 13–17.7)
IMM GRANULOCYTES # BLD AUTO: 0.02 10*3/MM3 (ref 0–0.05)
IMM GRANULOCYTES NFR BLD AUTO: 0.4 % (ref 0–0.5)
LDLC SERPL CALC-MCNC: 80 MG/DL (ref 0–100)
LYMPHOCYTES # BLD AUTO: 1.58 10*3/MM3 (ref 0.7–3.1)
LYMPHOCYTES NFR BLD AUTO: 28.8 % (ref 19.6–45.3)
MCH RBC QN AUTO: 30.1 PG (ref 26.6–33)
MCHC RBC AUTO-ENTMCNC: 31.9 G/DL (ref 31.5–35.7)
MCV RBC AUTO: 94.1 FL (ref 79–97)
MONOCYTES # BLD AUTO: 0.52 10*3/MM3 (ref 0.1–0.9)
MONOCYTES NFR BLD AUTO: 9.5 % (ref 5–12)
NEUTROPHILS # BLD AUTO: 3.16 10*3/MM3 (ref 1.7–7)
NEUTROPHILS NFR BLD AUTO: 57.5 % (ref 42.7–76)
NRBC BLD AUTO-RTO: 0 /100 WBC (ref 0–0.2)
PLATELET # BLD AUTO: 162 10*3/MM3 (ref 140–450)
POTASSIUM SERPL-SCNC: 5 MMOL/L (ref 3.5–5.2)
PROT SERPL-MCNC: 7.3 G/DL (ref 6–8.5)
RBC # BLD AUTO: 5.79 10*6/MM3 (ref 4.14–5.8)
SODIUM SERPL-SCNC: 139 MMOL/L (ref 136–145)
TRIGL SERPL-MCNC: 289 MG/DL (ref 0–150)
VLDLC SERPL CALC-MCNC: 57.8 MG/DL
WBC # BLD AUTO: 5.49 10*3/MM3 (ref 3.4–10.8)

## 2019-06-04 RX ORDER — PREGABALIN 225 MG/1
CAPSULE ORAL
Qty: 90 CAPSULE | Refills: 0 | Status: SHIPPED | OUTPATIENT
Start: 2019-06-04 | End: 2019-06-10 | Stop reason: SDUPTHER

## 2019-06-10 ENCOUNTER — OFFICE VISIT (OUTPATIENT)
Dept: INTERNAL MEDICINE | Facility: CLINIC | Age: 54
End: 2019-06-10

## 2019-06-10 VITALS
TEMPERATURE: 97.9 F | BODY MASS INDEX: 32.34 KG/M2 | WEIGHT: 219 LBS | HEART RATE: 120 BPM | SYSTOLIC BLOOD PRESSURE: 138 MMHG | DIASTOLIC BLOOD PRESSURE: 88 MMHG | OXYGEN SATURATION: 94 %

## 2019-06-10 DIAGNOSIS — R73.01 IMPAIRED FASTING GLUCOSE: ICD-10-CM

## 2019-06-10 DIAGNOSIS — K76.0 FATTY LIVER: ICD-10-CM

## 2019-06-10 DIAGNOSIS — H11.32 SUBCONJUNCTIVAL HEMORRHAGE OF LEFT EYE: ICD-10-CM

## 2019-06-10 DIAGNOSIS — G62.9 POLYNEUROPATHY: ICD-10-CM

## 2019-06-10 DIAGNOSIS — IMO0001 UNCONTROLLED TYPE 2 DIABETES MELLITUS WITHOUT COMPLICATION, WITHOUT LONG-TERM CURRENT USE OF INSULIN: ICD-10-CM

## 2019-06-10 DIAGNOSIS — E78.2 MIXED HYPERLIPIDEMIA: Primary | ICD-10-CM

## 2019-06-10 DIAGNOSIS — F32.A DEPRESSION, UNSPECIFIED DEPRESSION TYPE: ICD-10-CM

## 2019-06-10 PROCEDURE — 99214 OFFICE O/P EST MOD 30 MIN: CPT | Performed by: FAMILY MEDICINE

## 2019-06-10 RX ORDER — DULOXETIN HYDROCHLORIDE 60 MG/1
60 CAPSULE, DELAYED RELEASE ORAL DAILY
Qty: 90 CAPSULE | Refills: 3 | Status: SHIPPED | OUTPATIENT
Start: 2019-06-10 | End: 2019-11-19 | Stop reason: SDUPTHER

## 2019-06-10 RX ORDER — ICOSAPENT ETHYL 1000 MG/1
2 CAPSULE ORAL 2 TIMES DAILY WITH MEALS
Qty: 120 CAPSULE | Refills: 5 | Status: SHIPPED | OUTPATIENT
Start: 2019-06-10 | End: 2019-12-11 | Stop reason: SINTOL

## 2019-06-10 RX ORDER — PREGABALIN 225 MG/1
225 CAPSULE ORAL 3 TIMES DAILY
Qty: 90 CAPSULE | Refills: 5 | Status: SHIPPED | OUTPATIENT
Start: 2019-06-10 | End: 2019-12-11 | Stop reason: SDUPTHER

## 2019-06-10 NOTE — PROGRESS NOTES
Subjective   Brady Mota is a 53 y.o. male.     Chief Complaint   Patient presents with   • Diabetes   • Leg Pain   • Pain   • Hyperlipidemia   • Depression         Diabetes   He has type 2 diabetes mellitus. No MedicAlert identification noted. The initial diagnosis of diabetes was made 5 years ago. Hypoglycemia symptoms include sleepiness. Pertinent negatives for hypoglycemia include no confusion, dizziness, headaches, hunger, mood changes, nervousness/anxiousness, pallor, seizures, speech difficulty, sweats or tremors. Associated symptoms include fatigue, foot paresthesias and foot ulcerations. Pertinent negatives for diabetes include no blurred vision, no chest pain, no polydipsia, no polyphagia, no polyuria, no visual change, no weakness and no weight loss. Pertinent negatives for hypoglycemia complications include no blackouts, no hospitalization, no nocturnal hypoglycemia, no required assistance and no required glucagon injection. Symptoms are stable. Diabetic complications include peripheral neuropathy. Pertinent negatives for diabetic complications include no CVA, heart disease, impotence, nephropathy, PVD or retinopathy. Risk factors for coronary artery disease include family history, obesity, sedentary lifestyle and stress. Current diabetic treatment includes diet and oral agent (monotherapy). He is compliant with treatment most of the time. His weight is stable. He is following a generally unhealthy diet. Meal planning includes avoidance of concentrated sweets. He has not had a previous visit with a dietitian. He rarely participates in exercise. His home blood glucose trend is fluctuating minimally. He sees a podiatrist.Eye exam is current.      Patient returns with a history of diabetes type 2 polyneuropathy involving the arms and legs.  Hyperlipidemia hypertriglyceridemia fatty liver.  Also some degree of depression which is quiescent.  We discussed prior lab results and will add vascepa along with  his other medications.  Labs will be repeated in 6 months.  Another consideration would be additional diabetes medicines.  He wants to work on his diet and exercise again.      The following portions of the patient's history were reviewed and updated as appropriate: allergies, current medications, past social history and problem list.    Review of Systems   Constitutional: Positive for fatigue. Negative for weight loss.   Eyes: Negative for blurred vision.   Cardiovascular: Negative for chest pain.   Endocrine: Negative for polydipsia, polyphagia and polyuria.   Genitourinary: Negative for impotence.   Skin: Negative for pallor.   Neurological: Negative for dizziness, tremors, seizures, speech difficulty, weakness and headaches.   Psychiatric/Behavioral: Negative for confusion. The patient is not nervous/anxious.        Objective   Vitals:    06/10/19 1342   BP: 138/88   Pulse: 120   Temp: 97.9 °F (36.6 °C)   SpO2: 94%     Physical Exam   Constitutional: He is oriented to person, place, and time. He appears well-developed and well-nourished.   HENT:   Head: Normocephalic and atraumatic.   Right Ear: Tympanic membrane and external ear normal.   Left Ear: Tympanic membrane and external ear normal.   Nose: Nose normal.   Mouth/Throat: Oropharynx is clear and moist.   Eyes: Conjunctivae and EOM are normal. Pupils are equal, round, and reactive to light.       Neck: Normal range of motion. Neck supple. No JVD present. No thyromegaly present.   Cardiovascular: Normal rate, regular rhythm, normal heart sounds and intact distal pulses.   Pulmonary/Chest: Effort normal and breath sounds normal.   Abdominal: Soft. Bowel sounds are normal.   Musculoskeletal: Normal range of motion.   Lymphadenopathy:     He has no cervical adenopathy.   Neurological: He is alert and oriented to person, place, and time. A sensory deficit is present. No cranial nerve deficit. Coordination normal.   Skin: Skin is warm and dry. No rash noted.    Psychiatric: He has a normal mood and affect. His behavior is normal. Judgment and thought content normal.   Vitals reviewed.      Assessment/Plan   Problem List Items Addressed This Visit        Cardiovascular and Mediastinum    Hyperlipidemia - Primary    Relevant Medications    icosapent ethyl (VASCEPA) 1 g capsule capsule    Other Relevant Orders    CBC & Differential    Comprehensive Metabolic Panel    Hemoglobin A1c    Lipid Panel With / Chol / HDL Ratio    Vitamin B12    TSH    PSA, Total & Free       Endocrine    Uncontrolled type 2 diabetes mellitus without complication, without long-term current use of insulin (CMS/[facility])    Relevant Orders    CBC & Differential    Comprehensive Metabolic Panel    Hemoglobin A1c    Lipid Panel With / Chol / HDL Ratio    Vitamin B12    TSH    PSA, Total & Free    Impaired fasting glucose    Relevant Orders    CBC & Differential    Comprehensive Metabolic Panel    Hemoglobin A1c    Lipid Panel With / Chol / HDL Ratio    Vitamin B12    TSH    PSA, Total & Free       Nervous and Auditory    Polyneuropathy    Relevant Orders    CBC & Differential    Comprehensive Metabolic Panel    Hemoglobin A1c    Lipid Panel With / Chol / HDL Ratio    Vitamin B12    TSH    PSA, Total & Free      Other Visit Diagnoses     Depression, unspecified depression type        Relevant Medications    DULoxetine (CYMBALTA) 60 MG capsule    Fatty liver        Relevant Orders    CBC & Differential    Comprehensive Metabolic Panel    Hemoglobin A1c    Lipid Panel With / Chol / HDL Ratio    Vitamin B12    TSH    PSA, Total & Free      Labs prior to next visit.  Refills are given.  Continue duloxetine 60 mg daily.  Add vascepa 2000 mg twice daily.

## 2019-06-28 RX ORDER — METFORMIN HYDROCHLORIDE EXTENDED-RELEASE TABLETS 1000 MG/1
TABLET, FILM COATED, EXTENDED RELEASE ORAL
Qty: 180 TABLET | Refills: 1 | Status: SHIPPED | OUTPATIENT
Start: 2019-06-28 | End: 2019-11-19 | Stop reason: SDUPTHER

## 2019-08-09 RX ORDER — ATORVASTATIN CALCIUM 20 MG/1
TABLET, FILM COATED ORAL
Qty: 90 TABLET | Refills: 1 | Status: SHIPPED | OUTPATIENT
Start: 2019-08-09 | End: 2022-01-01 | Stop reason: ALTCHOICE

## 2019-11-19 DIAGNOSIS — F32.A DEPRESSION, UNSPECIFIED DEPRESSION TYPE: ICD-10-CM

## 2019-11-19 RX ORDER — DULOXETIN HYDROCHLORIDE 60 MG/1
CAPSULE, DELAYED RELEASE ORAL
Qty: 90 CAPSULE | Refills: 1 | Status: SHIPPED | OUTPATIENT
Start: 2019-11-19 | End: 2020-01-28 | Stop reason: SDUPTHER

## 2019-11-19 RX ORDER — METFORMIN HYDROCHLORIDE EXTENDED-RELEASE TABLETS 1000 MG/1
TABLET, FILM COATED, EXTENDED RELEASE ORAL
Qty: 180 TABLET | Refills: 1 | Status: SHIPPED | OUTPATIENT
Start: 2019-11-19 | End: 2020-03-11 | Stop reason: SDUPTHER

## 2019-12-06 LAB
ALBUMIN SERPL-MCNC: 4.6 G/DL (ref 3.5–5.2)
ALBUMIN/GLOB SERPL: 1.8 G/DL
ALP SERPL-CCNC: 94 U/L (ref 39–117)
ALT SERPL-CCNC: 121 U/L (ref 1–41)
AST SERPL-CCNC: 106 U/L (ref 1–40)
BASOPHILS # BLD AUTO: 0.03 10*3/MM3 (ref 0–0.2)
BASOPHILS NFR BLD AUTO: 0.6 % (ref 0–1.5)
BILIRUB SERPL-MCNC: 0.4 MG/DL (ref 0.2–1.2)
BUN SERPL-MCNC: 11 MG/DL (ref 6–20)
BUN/CREAT SERPL: 14.3 (ref 7–25)
CALCIUM SERPL-MCNC: 9.7 MG/DL (ref 8.6–10.5)
CHLORIDE SERPL-SCNC: 99 MMOL/L (ref 98–107)
CHOLEST SERPL-MCNC: 185 MG/DL (ref 0–200)
CHOLEST/HDLC SERPL: 5.44 {RATIO}
CO2 SERPL-SCNC: 23.8 MMOL/L (ref 22–29)
CREAT SERPL-MCNC: 0.77 MG/DL (ref 0.76–1.27)
EOSINOPHIL # BLD AUTO: 0.18 10*3/MM3 (ref 0–0.4)
EOSINOPHIL NFR BLD AUTO: 3.7 % (ref 0.3–6.2)
ERYTHROCYTE [DISTWIDTH] IN BLOOD BY AUTOMATED COUNT: 12.9 % (ref 12.3–15.4)
GLOBULIN SER CALC-MCNC: 2.6 GM/DL
GLUCOSE SERPL-MCNC: 235 MG/DL (ref 65–99)
HBA1C MFR BLD: 7.9 % (ref 4.8–5.6)
HCT VFR BLD AUTO: 47.7 % (ref 37.5–51)
HDLC SERPL-MCNC: 34 MG/DL (ref 40–60)
HGB BLD-MCNC: 16.7 G/DL (ref 13–17.7)
IMM GRANULOCYTES # BLD AUTO: 0.01 10*3/MM3 (ref 0–0.05)
IMM GRANULOCYTES NFR BLD AUTO: 0.2 % (ref 0–0.5)
LDLC SERPL CALC-MCNC: ABNORMAL MG/DL
LYMPHOCYTES # BLD AUTO: 1.31 10*3/MM3 (ref 0.7–3.1)
LYMPHOCYTES NFR BLD AUTO: 26.9 % (ref 19.6–45.3)
MCH RBC QN AUTO: 30.4 PG (ref 26.6–33)
MCHC RBC AUTO-ENTMCNC: 35 G/DL (ref 31.5–35.7)
MCV RBC AUTO: 86.9 FL (ref 79–97)
MONOCYTES # BLD AUTO: 0.41 10*3/MM3 (ref 0.1–0.9)
MONOCYTES NFR BLD AUTO: 8.4 % (ref 5–12)
NEUTROPHILS # BLD AUTO: 2.93 10*3/MM3 (ref 1.7–7)
NEUTROPHILS NFR BLD AUTO: 60.2 % (ref 42.7–76)
NRBC BLD AUTO-RTO: 0 /100 WBC (ref 0–0.2)
PLATELET # BLD AUTO: 165 10*3/MM3 (ref 140–450)
POTASSIUM SERPL-SCNC: 4.5 MMOL/L (ref 3.5–5.2)
PROT SERPL-MCNC: 7.2 G/DL (ref 6–8.5)
PSA FREE MFR SERPL: 14.5 %
PSA FREE SERPL-MCNC: 0.16 NG/ML
PSA SERPL-MCNC: 1.1 NG/ML (ref 0–4)
RBC # BLD AUTO: 5.49 10*6/MM3 (ref 4.14–5.8)
SODIUM SERPL-SCNC: 140 MMOL/L (ref 136–145)
TRIGL SERPL-MCNC: 411 MG/DL (ref 0–150)
TSH SERPL DL<=0.005 MIU/L-ACNC: 0.83 UIU/ML (ref 0.27–4.2)
VIT B12 SERPL-MCNC: 993 PG/ML (ref 211–946)
VLDLC SERPL CALC-MCNC: ABNORMAL MG/DL
WBC # BLD AUTO: 4.87 10*3/MM3 (ref 3.4–10.8)

## 2019-12-11 ENCOUNTER — OFFICE VISIT (OUTPATIENT)
Dept: INTERNAL MEDICINE | Facility: CLINIC | Age: 54
End: 2019-12-11

## 2019-12-11 VITALS
SYSTOLIC BLOOD PRESSURE: 146 MMHG | TEMPERATURE: 97 F | BODY MASS INDEX: 32.49 KG/M2 | OXYGEN SATURATION: 95 % | HEART RATE: 89 BPM | DIASTOLIC BLOOD PRESSURE: 86 MMHG | WEIGHT: 220 LBS

## 2019-12-11 DIAGNOSIS — M79.604 PAIN IN BOTH LOWER EXTREMITIES: ICD-10-CM

## 2019-12-11 DIAGNOSIS — IMO0001 UNCONTROLLED TYPE 2 DIABETES MELLITUS WITHOUT COMPLICATION, WITHOUT LONG-TERM CURRENT USE OF INSULIN: Primary | ICD-10-CM

## 2019-12-11 DIAGNOSIS — R41.3 MEMORY CHANGE: ICD-10-CM

## 2019-12-11 DIAGNOSIS — M79.605 PAIN IN BOTH LOWER EXTREMITIES: ICD-10-CM

## 2019-12-11 DIAGNOSIS — G62.9 POLYNEUROPATHY: ICD-10-CM

## 2019-12-11 DIAGNOSIS — G47.33 OSA TREATED WITH BIPAP: ICD-10-CM

## 2019-12-11 DIAGNOSIS — E78.2 MIXED HYPERLIPIDEMIA: ICD-10-CM

## 2019-12-11 PROCEDURE — 99214 OFFICE O/P EST MOD 30 MIN: CPT | Performed by: FAMILY MEDICINE

## 2019-12-11 RX ORDER — BLOOD-GLUCOSE METER
1 KIT MISCELLANEOUS 2 TIMES DAILY
Qty: 1 EACH | Refills: 0 | Status: SHIPPED | OUTPATIENT
Start: 2019-12-11 | End: 2020-03-11

## 2019-12-11 RX ORDER — PREGABALIN 225 MG/1
225 CAPSULE ORAL 3 TIMES DAILY
Qty: 90 CAPSULE | Refills: 5 | Status: SHIPPED | OUTPATIENT
Start: 2019-12-11 | End: 2020-03-11 | Stop reason: SDUPTHER

## 2019-12-11 RX ORDER — CEPHALEXIN 500 MG/1
CAPSULE ORAL
Refills: 0 | COMMUNITY
Start: 2019-11-25 | End: 2020-03-11

## 2019-12-11 NOTE — PROGRESS NOTES
Subjective   Brady Mota is a 54 y.o. male.     Chief Complaint   Patient presents with   • Diabetes   • Hyperlipidemia   • Hypertension   Memory change      History of Present Illness   Delightful gentleman with a history of type 2 diabetes not perfectly controlled and history of diabetic peripheral neuropathy now seeing podiatry with evidence of diabetic blister which is improved.  He is taking high-dose Lyrica in reference to diabetic peripheral neuropathy and polyneuropathy.  We discussed aspects of diabetic control and will place him on Farxiga 5 mg daily samples given and recheck in 3 months with labs preceding the visit.  Otherwise we discussed diet and weight loss as well as exercise.  Other treatments for  hyperlipidemia are reviewed and continued.  He takes ball to for pain control as well.    He was unable to tolerate the CPAP for triglycerides because of gastrointestinal issues.    He has had some degree of what he describes his memory loss.  We will get an appointment with neurology to see if this is related to Lyrica also in reference to history of polyneuropathy and obstructive sleep apnea with currently an ineffective machine.      The following portions of the patient's history were reviewed and updated as appropriate: allergies, current medications, past social history and problem list.    Review of Systems   Constitutional: Negative.    HENT: Negative.    Eyes: Negative.    Respiratory: Negative.    Cardiovascular: Negative.    Gastrointestinal: Negative.    Endocrine: Negative.    Genitourinary: Negative.    Musculoskeletal: Positive for myalgias.   Skin: Positive for wound.   Allergic/Immunologic: Negative.    Neurological: Positive for numbness.   Hematological: Negative.    Psychiatric/Behavioral: Positive for decreased concentration.       Objective   Vitals:    12/11/19 1400   BP: 146/86   Pulse: 89   Temp: 97 °F (36.1 °C)   SpO2: 95%     Physical Exam   Constitutional: He is oriented to  person, place, and time. He appears well-developed and well-nourished.   HENT:   Head: Normocephalic and atraumatic.   Right Ear: Tympanic membrane and external ear normal.   Left Ear: Tympanic membrane and external ear normal.   Nose: Nose normal.   Mouth/Throat: Oropharynx is clear and moist.   Eyes: Pupils are equal, round, and reactive to light. Conjunctivae and EOM are normal.   Neck: Normal range of motion. Neck supple. No JVD present. No thyromegaly present.   Cardiovascular: Normal rate, regular rhythm, normal heart sounds and intact distal pulses.   Pulmonary/Chest: Effort normal and breath sounds normal.   Abdominal: Soft. Bowel sounds are normal.   Musculoskeletal: Normal range of motion.   Lymphadenopathy:     He has no cervical adenopathy.   Neurological: He is alert and oriented to person, place, and time. No cranial nerve deficit. Coordination normal.   Skin: Skin is warm and dry. No rash noted.   Psychiatric: He has a normal mood and affect. His behavior is normal. Judgment and thought content normal.   Vitals reviewed.      Assessment/Plan   Problem List Items Addressed This Visit        Cardiovascular and Mediastinum    Hyperlipidemia    Relevant Orders    Hemoglobin A1c    Comprehensive Metabolic Panel    Lipid Panel With / Chol / HDL Ratio       Endocrine    Uncontrolled type 2 diabetes mellitus without complication, without long-term current use of insulin (CMS/[facility]) - Primary    Relevant Orders    Hemoglobin A1c    Comprehensive Metabolic Panel    Lipid Panel With / Chol / HDL Ratio       Nervous and Auditory    Polyneuropathy    Relevant Medications    pregabalin (LYRICA) 225 MG capsule    Other Relevant Orders    Ambulatory Referral to Neurology    Hemoglobin A1c    Comprehensive Metabolic Panel    Lipid Panel With / Chol / HDL Ratio    Pain of lower extremity    Relevant Medications    pregabalin (LYRICA) 225 MG capsule      Other Visit Diagnoses     Memory change        Relevant Orders     Ambulatory Referral to Neurology    PASTOR treated with BiPAP        Relevant Orders    Ambulatory Referral to Neurology      Plan: Referral neurology reference to memory change polyneuropathy Lyrica and obstructive sleep apnea.  Start Farxiga 5 mg daily.  Recheck in 3 months the labs in the visit.

## 2020-01-13 ENCOUNTER — TELEPHONE (OUTPATIENT)
Dept: INTERNAL MEDICINE | Facility: CLINIC | Age: 55
End: 2020-01-13

## 2020-01-28 DIAGNOSIS — F32.A DEPRESSION, UNSPECIFIED DEPRESSION TYPE: ICD-10-CM

## 2020-01-28 RX ORDER — DULOXETIN HYDROCHLORIDE 60 MG/1
60 CAPSULE, DELAYED RELEASE ORAL DAILY
Qty: 90 CAPSULE | Refills: 1 | Status: SHIPPED | OUTPATIENT
Start: 2020-01-28 | End: 2020-03-11 | Stop reason: SDUPTHER

## 2020-01-29 ENCOUNTER — TELEPHONE (OUTPATIENT)
Dept: INTERNAL MEDICINE | Facility: CLINIC | Age: 55
End: 2020-01-29

## 2020-02-12 NOTE — TELEPHONE ENCOUNTER
I can not find the letter the pt sent me, I'm guessing it may have gotten lost in our move.  I have left the pt a msg on his voicemail to send me another copy so I can send both letters to Nic.

## 2020-02-21 ENCOUNTER — TELEPHONE (OUTPATIENT)
Dept: INTERNAL MEDICINE | Facility: CLINIC | Age: 55
End: 2020-02-21

## 2020-02-21 NOTE — TELEPHONE ENCOUNTER
PT WANTS TO KNOW IF NICOTINE/COTI9 CAN BE ADDED TO THE LAB WORK REQUEST THAT IS BEING DONE ON 3/4. HIS EMPLOYER IS REQUESTING IT      C/B  [phone]

## 2020-02-24 DIAGNOSIS — Z13.89 SCREENING FOR SUBSTANCE ABUSE: Primary | ICD-10-CM

## 2020-02-29 ENCOUNTER — RESULTS ENCOUNTER (OUTPATIENT)
Dept: INTERNAL MEDICINE | Facility: CLINIC | Age: 55
End: 2020-02-29

## 2020-02-29 DIAGNOSIS — Z13.89 SCREENING FOR SUBSTANCE ABUSE: ICD-10-CM

## 2020-03-04 ENCOUNTER — RESULTS ENCOUNTER (OUTPATIENT)
Dept: INTERNAL MEDICINE | Facility: CLINIC | Age: 55
End: 2020-03-04

## 2020-03-04 DIAGNOSIS — E78.2 MIXED HYPERLIPIDEMIA: ICD-10-CM

## 2020-03-04 DIAGNOSIS — G62.9 POLYNEUROPATHY: ICD-10-CM

## 2020-03-04 DIAGNOSIS — IMO0001 UNCONTROLLED TYPE 2 DIABETES MELLITUS WITHOUT COMPLICATION, WITHOUT LONG-TERM CURRENT USE OF INSULIN: ICD-10-CM

## 2020-03-04 DIAGNOSIS — Z13.89 SCREENING FOR SUBSTANCE ABUSE: ICD-10-CM

## 2020-03-08 LAB
ALBUMIN SERPL-MCNC: 4.6 G/DL (ref 3.5–5.2)
ALBUMIN/GLOB SERPL: 1.8 G/DL
ALP SERPL-CCNC: 75 U/L (ref 39–117)
ALT SERPL-CCNC: 76 U/L (ref 1–41)
AST SERPL-CCNC: 70 U/L (ref 1–40)
BILIRUB SERPL-MCNC: 0.5 MG/DL (ref 0.2–1.2)
BUN SERPL-MCNC: 11 MG/DL (ref 6–20)
BUN/CREAT SERPL: 13.8 (ref 7–25)
CALCIUM SERPL-MCNC: 9.3 MG/DL (ref 8.6–10.5)
CHLORIDE SERPL-SCNC: 98 MMOL/L (ref 98–107)
CHOLEST SERPL-MCNC: 162 MG/DL (ref 0–200)
CHOLEST/HDLC SERPL: 4.63 {RATIO}
CO2 SERPL-SCNC: 26.8 MMOL/L (ref 22–29)
COTININE SERPL-MCNC: NORMAL NG/ML
CREAT SERPL-MCNC: 0.8 MG/DL (ref 0.76–1.27)
GLOBULIN SER CALC-MCNC: 2.5 GM/DL
GLUCOSE SERPL-MCNC: 147 MG/DL (ref 65–99)
HBA1C MFR BLD: 7.6 % (ref 4.8–5.6)
HDLC SERPL-MCNC: 35 MG/DL (ref 40–60)
LDLC SERPL CALC-MCNC: 87 MG/DL (ref 0–100)
NICOTINE SERPL-MCNC: NORMAL NG/ML
POTASSIUM SERPL-SCNC: 4.4 MMOL/L (ref 3.5–5.2)
PROT SERPL-MCNC: 7.1 G/DL (ref 6–8.5)
SODIUM SERPL-SCNC: 138 MMOL/L (ref 136–145)
TRIGL SERPL-MCNC: 198 MG/DL (ref 0–150)
VLDLC SERPL CALC-MCNC: 39.6 MG/DL

## 2020-03-11 ENCOUNTER — OFFICE VISIT (OUTPATIENT)
Dept: INTERNAL MEDICINE | Facility: CLINIC | Age: 55
End: 2020-03-11

## 2020-03-11 VITALS
WEIGHT: 215 LBS | BODY MASS INDEX: 31.75 KG/M2 | TEMPERATURE: 98.9 F | DIASTOLIC BLOOD PRESSURE: 96 MMHG | HEART RATE: 110 BPM | OXYGEN SATURATION: 98 % | SYSTOLIC BLOOD PRESSURE: 126 MMHG

## 2020-03-11 DIAGNOSIS — M54.9 UPPER BACK PAIN ON LEFT SIDE: ICD-10-CM

## 2020-03-11 DIAGNOSIS — M79.604 PAIN IN BOTH LOWER EXTREMITIES: ICD-10-CM

## 2020-03-11 DIAGNOSIS — F32.A DEPRESSION, UNSPECIFIED DEPRESSION TYPE: ICD-10-CM

## 2020-03-11 DIAGNOSIS — E78.2 MIXED HYPERLIPIDEMIA: ICD-10-CM

## 2020-03-11 DIAGNOSIS — G62.9 POLYNEUROPATHY: Primary | ICD-10-CM

## 2020-03-11 DIAGNOSIS — M79.605 PAIN IN BOTH LOWER EXTREMITIES: ICD-10-CM

## 2020-03-11 DIAGNOSIS — M25.512 ACUTE PAIN OF LEFT SHOULDER: ICD-10-CM

## 2020-03-11 DIAGNOSIS — IMO0001 UNCONTROLLED TYPE 2 DIABETES MELLITUS WITHOUT COMPLICATION, WITHOUT LONG-TERM CURRENT USE OF INSULIN: ICD-10-CM

## 2020-03-11 PROCEDURE — 99214 OFFICE O/P EST MOD 30 MIN: CPT | Performed by: FAMILY MEDICINE

## 2020-03-11 RX ORDER — CYCLOBENZAPRINE HCL 10 MG
10 TABLET ORAL 3 TIMES DAILY PRN
Qty: 90 TABLET | Refills: 3 | Status: SHIPPED | OUTPATIENT
Start: 2020-03-11

## 2020-03-11 RX ORDER — DULOXETIN HYDROCHLORIDE 60 MG/1
60 CAPSULE, DELAYED RELEASE ORAL DAILY
Qty: 90 CAPSULE | Refills: 1 | Status: SHIPPED | OUTPATIENT
Start: 2020-03-11 | End: 2020-09-28 | Stop reason: SDUPTHER

## 2020-03-11 RX ORDER — METFORMIN HYDROCHLORIDE EXTENDED-RELEASE TABLETS 1000 MG/1
1000 TABLET, FILM COATED, EXTENDED RELEASE ORAL 2 TIMES DAILY WITH MEALS
Qty: 180 TABLET | Refills: 1 | Status: SHIPPED | OUTPATIENT
Start: 2020-03-11 | End: 2021-04-27

## 2020-03-11 RX ORDER — PREGABALIN 225 MG/1
225 CAPSULE ORAL 3 TIMES DAILY
Qty: 90 CAPSULE | Refills: 5 | Status: SHIPPED | OUTPATIENT
Start: 2020-03-11 | End: 2020-07-13 | Stop reason: SDUPTHER

## 2020-03-11 NOTE — PROGRESS NOTES
Subjective  Answers for HPI/ROS submitted by the patient on 3/11/2020   What is the primary reason for your visit?: Other  Please describe your symptoms.: Followup to labwork; also shoulder pain  Have you had these symptoms before?: Yes  How long have you been having these symptoms?: 1-4 weeks ago  Please describe any probable cause for these symptoms. : Bursitis?    Brady Mota is a 54 y.o. male.     Chief Complaint   Patient presents with   • Diabetes   • Pain   • Hyperlipidemia   • Depression         History of Present Illness   Very pleasant gentleman whose had a history of polyneuropathy preceding the diagnosis of diabetes.  Several years ago was worked up for possible amyloidosis but this turned out to be negative after biopsies in [place].    He is developed left upper back pain after working on a house he is moving into and has had the pain in the vicinity of the left shoulder and left upper back for at least 3 to 4 weeks.  Will refer him to orthopedic surgery Dr. Pena who he is seen in the past.    GI diabetes is reviewed and he could not afford the extra medicine I gave him which is farxiga.  His A1c is improved anyway.  He is doing a better job with his diet.    Treatment of hyperlipidemia is reviewed.      The following portions of the patient's history were reviewed and updated as appropriate: allergies, current medications, past social history and problem list.    Review of Systems   Constitutional: Negative.    HENT: Negative.    Eyes: Negative.    Respiratory: Negative.    Cardiovascular: Negative.    Gastrointestinal: Negative.    Endocrine: Negative.    Genitourinary: Negative.    Musculoskeletal: Negative.    Skin: Negative.    Allergic/Immunologic: Negative.    Neurological: Positive for numbness.   Hematological: Negative.    Psychiatric/Behavioral: Negative.        Objective   Vitals:    03/11/20 1102   BP: 126/96   Pulse: 110   Temp: 98.9 °F (37.2 °C)   SpO2: 98%     Physical Exam    Constitutional: He is oriented to person, place, and time. He appears well-developed and well-nourished.   HENT:   Head: Normocephalic and atraumatic.   Right Ear: Tympanic membrane and external ear normal.   Left Ear: Tympanic membrane and external ear normal.   Nose: Nose normal.   Mouth/Throat: Oropharynx is clear and moist.   Eyes: Pupils are equal, round, and reactive to light. Conjunctivae and EOM are normal.   Neck: Normal range of motion. Neck supple. No JVD present. No thyromegaly present.   Cardiovascular: Normal rate, regular rhythm, normal heart sounds and intact distal pulses.   Pulmonary/Chest: Effort normal and breath sounds normal.   Abdominal: Soft. Bowel sounds are normal.   Musculoskeletal:        Left shoulder: He exhibits decreased range of motion, tenderness, pain and spasm.        Arms:  Lymphadenopathy:     He has no cervical adenopathy.   Neurological: He is alert and oriented to person, place, and time. A sensory deficit is present. No cranial nerve deficit. Coordination abnormal.   Patient has loss of proprioception in lower extremities.   Skin: Skin is warm and dry. No rash noted.   Psychiatric: He has a normal mood and affect. His behavior is normal. Judgment and thought content normal.   Vitals reviewed.      Assessment/Plan   Problem List Items Addressed This Visit        Cardiovascular and Mediastinum    Hyperlipidemia    Relevant Orders    Comprehensive Metabolic Panel    CBC & Differential    Hemoglobin A1c    Lipid Panel With / Chol / HDL Ratio    Urinalysis With Microscopic If Indicated (No Culture) - Urine, Clean Catch    TSH    T4, Free    Vitamin B12    Folate       Endocrine    Uncontrolled type 2 diabetes mellitus without complication, without long-term current use of insulin (CMS/[facility])    Relevant Medications    metFORMIN (FORTAMET) 1000 MG (OSM) 24 hr tablet    Other Relevant Orders    Comprehensive Metabolic Panel    CBC & Differential    Hemoglobin A1c    Lipid Panel  With / Chol / HDL Ratio    Urinalysis With Microscopic If Indicated (No Culture) - Urine, Clean Catch    TSH    T4, Free    Vitamin B12    Folate       Nervous and Auditory    Polyneuropathy - Primary    Relevant Medications    pregabalin (LYRICA) 225 MG capsule    Other Relevant Orders    Comprehensive Metabolic Panel    CBC & Differential    Hemoglobin A1c    Lipid Panel With / Chol / HDL Ratio    Urinalysis With Microscopic If Indicated (No Culture) - Urine, Clean Catch    TSH    T4, Free    Vitamin B12    Folate    Pain of lower extremity    Relevant Medications    pregabalin (LYRICA) 225 MG capsule      Other Visit Diagnoses     Upper back pain on left side        Relevant Orders    Ambulatory Referral to Orthopedic Surgery    Acute pain of left shoulder        Relevant Orders    Ambulatory Referral to Orthopedic Surgery    Depression, unspecified depression type        Relevant Medications    DULoxetine (CYMBALTA) 60 MG capsule      Plan: Labs pending with recheck in 6 months including B12 and folate also thyroid.  Refill her medicines and referral to orthopedic surgery for the left shoulder.

## 2020-03-13 ENCOUNTER — TELEPHONE (OUTPATIENT)
Dept: NEUROLOGY | Facility: CLINIC | Age: 55
End: 2020-03-13

## 2020-03-13 ENCOUNTER — OFFICE VISIT (OUTPATIENT)
Dept: INTERNAL MEDICINE | Facility: CLINIC | Age: 55
End: 2020-03-13

## 2020-03-13 VITALS
RESPIRATION RATE: 17 BRPM | DIASTOLIC BLOOD PRESSURE: 101 MMHG | SYSTOLIC BLOOD PRESSURE: 144 MMHG | OXYGEN SATURATION: 93 % | WEIGHT: 217 LBS | HEIGHT: 68 IN | HEART RATE: 97 BPM | BODY MASS INDEX: 32.89 KG/M2 | TEMPERATURE: 98.9 F

## 2020-03-13 DIAGNOSIS — J02.9 PHARYNGITIS, UNSPECIFIED ETIOLOGY: ICD-10-CM

## 2020-03-13 DIAGNOSIS — R05.9 COUGH IN ADULT: Primary | ICD-10-CM

## 2020-03-13 LAB
EXPIRATION DATE: NORMAL
FLUAV AG NPH QL: NEGATIVE
FLUBV AG NPH QL: NEGATIVE
INTERNAL CONTROL: NORMAL
Lab: NORMAL

## 2020-03-13 PROCEDURE — 99213 OFFICE O/P EST LOW 20 MIN: CPT | Performed by: FAMILY MEDICINE

## 2020-03-13 PROCEDURE — 87804 INFLUENZA ASSAY W/OPTIC: CPT | Performed by: FAMILY MEDICINE

## 2020-03-13 RX ORDER — AZITHROMYCIN 250 MG/1
TABLET, FILM COATED ORAL
Qty: 6 TABLET | Refills: 0 | Status: SHIPPED | OUTPATIENT
Start: 2020-03-13 | End: 2020-10-05

## 2020-03-13 RX ORDER — IPRATROPIUM BROMIDE 42 UG/1
2 SPRAY, METERED NASAL 4 TIMES DAILY
Qty: 15 ML | Refills: 12 | Status: SHIPPED | OUTPATIENT
Start: 2020-03-13 | End: 2021-04-19

## 2020-03-13 NOTE — PROGRESS NOTES
Subjective   Brady Mota is a 54 y.o. male.     Chief Complaint   Patient presents with   • Sore Throat         History of Present Illness   She was in the office recently but over the past 3 days developed sore throat with postnasal drainage but no cough no fever.  POC flu test is negative.  His blood pressure is elevated transiently in the office.  He has had no foreign travel and no contact with anyone with any suspicious diseases or coronavirus 19.      The following portions of the patient's history were reviewed and updated as appropriate: allergies, current medications, past social history and problem list.    Review of Systems   Constitutional: Negative.  Negative for fever.   HENT: Positive for postnasal drip and sore throat.    Eyes: Negative.    Respiratory: Negative.    Cardiovascular: Negative.    Gastrointestinal: Negative.    Endocrine: Negative.    Genitourinary: Negative.    Musculoskeletal: Negative.    Skin: Negative.    Allergic/Immunologic: Negative.    Neurological: Negative.    Hematological: Negative.    Psychiatric/Behavioral: Negative.        Objective   Vitals:    03/13/20 1305   BP: (!) 144/101   Pulse: 97   Resp: 17   Temp: 98.9 °F (37.2 °C)   SpO2: 93%     Physical Exam   Constitutional: He is oriented to person, place, and time. He appears well-developed and well-nourished.   HENT:   Head: Normocephalic and atraumatic.   Right Ear: Tympanic membrane and external ear normal.   Left Ear: Tympanic membrane and external ear normal.   Nose: Nose normal.   Mouth/Throat: Posterior oropharyngeal edema and posterior oropharyngeal erythema present.       Eyes: Pupils are equal, round, and reactive to light. Conjunctivae and EOM are normal.   Neck: Normal range of motion. Neck supple. No JVD present. No thyromegaly present.   Cardiovascular: Normal rate, regular rhythm, normal heart sounds and intact distal pulses.   Pulmonary/Chest: Effort normal and breath sounds normal.   Abdominal: Soft.  Bowel sounds are normal.   Musculoskeletal: Normal range of motion.   Lymphadenopathy:     He has no cervical adenopathy.   Neurological: He is alert and oriented to person, place, and time. No cranial nerve deficit. Coordination normal.   Skin: Skin is warm and dry. No rash noted.   Psychiatric: He has a normal mood and affect. His behavior is normal. Judgment and thought content normal.   Vitals reviewed.      Assessment/Plan   Problem List Items Addressed This Visit     None      Visit Diagnoses     Cough in adult    -  Primary    Relevant Orders    POC Influenza A / B (Completed)    Pharyngitis, unspecified etiology          Plan: Treat as for pharyngitis flu test is negative.  Monitor other symptoms.  He will work from home.

## 2020-04-21 ENCOUNTER — TELEPHONE (OUTPATIENT)
Dept: INTERNAL MEDICINE | Facility: CLINIC | Age: 55
End: 2020-04-21

## 2020-04-21 NOTE — TELEPHONE ENCOUNTER
PT CALLED ABOUT THE APPEAL FOR HIS PRILOSEC. HE WAITED 30 DAYS AND CALLED HIS NURSE ADVOCATE WITH [facility] AND THEY TOLD HIM THAT IT NEEDS TO BE DONE AGAIN FOR THE GENERIC FOR PRILOSEC.

## 2020-04-21 NOTE — TELEPHONE ENCOUNTER
The pt spoke with his  with [facility] and they want you to resubmit his letter with Pregabalin on it not brand name Lyrica  Please redictate the letter 2/9/19 so I can refax it to [facility]    This is not for Omeprazole/Prilosec

## 2020-07-13 DIAGNOSIS — G62.9 POLYNEUROPATHY: ICD-10-CM

## 2020-07-13 DIAGNOSIS — M79.604 PAIN IN BOTH LOWER EXTREMITIES: ICD-10-CM

## 2020-07-13 DIAGNOSIS — M79.605 PAIN IN BOTH LOWER EXTREMITIES: ICD-10-CM

## 2020-07-13 NOTE — TELEPHONE ENCOUNTER
PATIENT REQUESTED A REFILL ON:pregabalin (LYRICA) 225 MG capsule    PATIENT CAN BE REACHED ON:[phone]    PHARMACY PREFERRED:[facility] - [place] [address] AT [address] - [phone]  - [phone] FX

## 2020-07-14 RX ORDER — PREGABALIN 225 MG/1
225 CAPSULE ORAL 3 TIMES DAILY
Qty: 90 CAPSULE | Refills: 5 | Status: SHIPPED | OUTPATIENT
Start: 2020-07-14 | End: 2021-02-01

## 2020-09-02 ENCOUNTER — RESULTS ENCOUNTER (OUTPATIENT)
Dept: INTERNAL MEDICINE | Facility: CLINIC | Age: 55
End: 2020-09-02

## 2020-09-02 DIAGNOSIS — IMO0001 UNCONTROLLED TYPE 2 DIABETES MELLITUS WITHOUT COMPLICATION, WITHOUT LONG-TERM CURRENT USE OF INSULIN: ICD-10-CM

## 2020-09-02 DIAGNOSIS — E78.2 MIXED HYPERLIPIDEMIA: ICD-10-CM

## 2020-09-02 DIAGNOSIS — G62.9 POLYNEUROPATHY: ICD-10-CM

## 2020-09-22 LAB
ALBUMIN SERPL-MCNC: 4.9 G/DL (ref 3.5–5.2)
ALBUMIN/GLOB SERPL: 2.7 G/DL
ALP SERPL-CCNC: 71 U/L (ref 39–117)
ALT SERPL-CCNC: 142 U/L (ref 1–41)
AST SERPL-CCNC: 114 U/L (ref 1–40)
BASOPHILS # BLD AUTO: 0.05 10*3/MM3 (ref 0–0.2)
BASOPHILS NFR BLD AUTO: 1.1 % (ref 0–1.5)
BILIRUB SERPL-MCNC: 0.5 MG/DL (ref 0–1.2)
BUN SERPL-MCNC: 11 MG/DL (ref 6–20)
BUN/CREAT SERPL: 13.9 (ref 7–25)
CALCIUM SERPL-MCNC: 8.9 MG/DL (ref 8.6–10.5)
CHLORIDE SERPL-SCNC: 101 MMOL/L (ref 98–107)
CHOLEST SERPL-MCNC: 242 MG/DL (ref 0–200)
CHOLEST/HDLC SERPL: 6.91 {RATIO}
CO2 SERPL-SCNC: 26 MMOL/L (ref 22–29)
CREAT SERPL-MCNC: 0.79 MG/DL (ref 0.76–1.27)
EOSINOPHIL # BLD AUTO: 0.27 10*3/MM3 (ref 0–0.4)
EOSINOPHIL NFR BLD AUTO: 5.7 % (ref 0.3–6.2)
ERYTHROCYTE [DISTWIDTH] IN BLOOD BY AUTOMATED COUNT: 12.4 % (ref 12.3–15.4)
FOLATE SERPL-MCNC: 18.9 NG/ML (ref 4.78–24.2)
GLOBULIN SER CALC-MCNC: 1.8 GM/DL
GLUCOSE SERPL-MCNC: 149 MG/DL (ref 65–99)
HBA1C MFR BLD: 7.3 % (ref 4.8–5.6)
HCT VFR BLD AUTO: 45.8 % (ref 37.5–51)
HDLC SERPL-MCNC: 35 MG/DL (ref 40–60)
HGB BLD-MCNC: 16.5 G/DL (ref 13–17.7)
IMM GRANULOCYTES # BLD AUTO: 0.01 10*3/MM3 (ref 0–0.05)
IMM GRANULOCYTES NFR BLD AUTO: 0.2 % (ref 0–0.5)
LDLC SERPL CALC-MCNC: 137 MG/DL (ref 0–100)
LYMPHOCYTES # BLD AUTO: 1.79 10*3/MM3 (ref 0.7–3.1)
LYMPHOCYTES NFR BLD AUTO: 37.9 % (ref 19.6–45.3)
MCH RBC QN AUTO: 31.4 PG (ref 26.6–33)
MCHC RBC AUTO-ENTMCNC: 36 G/DL (ref 31.5–35.7)
MCV RBC AUTO: 87.1 FL (ref 79–97)
MONOCYTES # BLD AUTO: 0.58 10*3/MM3 (ref 0.1–0.9)
MONOCYTES NFR BLD AUTO: 12.3 % (ref 5–12)
NEUTROPHILS # BLD AUTO: 2.02 10*3/MM3 (ref 1.7–7)
NEUTROPHILS NFR BLD AUTO: 42.8 % (ref 42.7–76)
NRBC BLD AUTO-RTO: 0 /100 WBC (ref 0–0.2)
PLATELET # BLD AUTO: 168 10*3/MM3 (ref 140–450)
POTASSIUM SERPL-SCNC: 4.5 MMOL/L (ref 3.5–5.2)
PROT SERPL-MCNC: 6.7 G/DL (ref 6–8.5)
RBC # BLD AUTO: 5.26 10*6/MM3 (ref 4.14–5.8)
SODIUM SERPL-SCNC: 139 MMOL/L (ref 136–145)
T4 FREE SERPL-MCNC: 1.1 NG/DL (ref 0.93–1.7)
TRIGL SERPL-MCNC: 348 MG/DL (ref 0–150)
TSH SERPL DL<=0.005 MIU/L-ACNC: 0.65 UIU/ML (ref 0.27–4.2)
UNABLE TO VOID: NORMAL
VIT B12 SERPL-MCNC: 1140 PG/ML (ref 211–946)
VLDLC SERPL CALC-MCNC: 69.6 MG/DL
WBC # BLD AUTO: 4.72 10*3/MM3 (ref 3.4–10.8)

## 2020-09-28 DIAGNOSIS — F32.A DEPRESSION, UNSPECIFIED DEPRESSION TYPE: ICD-10-CM

## 2020-09-28 RX ORDER — DULOXETIN HYDROCHLORIDE 60 MG/1
60 CAPSULE, DELAYED RELEASE ORAL DAILY
Qty: 90 CAPSULE | Refills: 1 | Status: SHIPPED | OUTPATIENT
Start: 2020-09-28 | End: 2020-11-06 | Stop reason: SDUPTHER

## 2020-09-28 NOTE — TELEPHONE ENCOUNTER
Caller: Brady Mota    Relationship: Self    Best call back number: [phone]    Medication needed:   Requested Prescriptions     Pending Prescriptions Disp Refills   • DULoxetine (CYMBALTA) 60 MG capsule 90 capsule 1     Sig: Take 1 capsule by mouth Daily.       When do you need the refill by: 9/28/20    What details did the patient provide when requesting the medication: pt called and has 1 pill left. He sees Ynaelis on 10/5. Would like a script to hold him until appt.     Does the patient have less than a 3 day supply:  [x] Yes  [] No    What is the patient's preferred pharmacy:    [id number] - [facility] [address] AT [address] - [phone] [facility] [phone]   [phone]

## 2020-10-05 ENCOUNTER — OFFICE VISIT (OUTPATIENT)
Dept: INTERNAL MEDICINE | Facility: CLINIC | Age: 55
End: 2020-10-05

## 2020-10-05 VITALS
HEART RATE: 109 BPM | RESPIRATION RATE: 16 BRPM | WEIGHT: 218 LBS | DIASTOLIC BLOOD PRESSURE: 97 MMHG | TEMPERATURE: 98.9 F | BODY MASS INDEX: 33.04 KG/M2 | OXYGEN SATURATION: 94 % | SYSTOLIC BLOOD PRESSURE: 159 MMHG | HEIGHT: 68 IN

## 2020-10-05 DIAGNOSIS — Z11.59 NEED FOR HEPATITIS C SCREENING TEST: ICD-10-CM

## 2020-10-05 DIAGNOSIS — G62.9 POLYNEUROPATHY: ICD-10-CM

## 2020-10-05 DIAGNOSIS — R79.89 ELEVATED LFTS: ICD-10-CM

## 2020-10-05 DIAGNOSIS — E78.2 MIXED HYPERLIPIDEMIA: Primary | ICD-10-CM

## 2020-10-05 DIAGNOSIS — F32.A DEPRESSION, UNSPECIFIED DEPRESSION TYPE: ICD-10-CM

## 2020-10-05 DIAGNOSIS — E11.9 TYPE 2 DIABETES MELLITUS WITHOUT COMPLICATION, WITHOUT LONG-TERM CURRENT USE OF INSULIN (HCC): ICD-10-CM

## 2020-10-05 PROCEDURE — 99214 OFFICE O/P EST MOD 30 MIN: CPT | Performed by: NURSE PRACTITIONER

## 2020-10-05 NOTE — PROGRESS NOTES
Subjective   Brady Mota is a 55 y.o. male.   Chief Complaint   Patient presents with   • Follow-up     Pt presents here today for a 6 month follow up.   Type 2 diabetes, hyperlipidemia    Patient presents for 6-month follow-up.  This is a 55-year-old male patient of Dr. mcmahan.  He had labs done preceding this visit on 9/22/2020.    He has hyperlipidemia and has been out of atorvastatin for about 6 months.  His lipid panel is showing elevated total cholesterol at 242 and elevated LDL cholesterol at 137.  Reports that he ran out about 6 months ago and forgot to ask for refill.    He has type 2 diabetes and takes metformin 1000 mg twice daily.  Reports that most days he forgets the morning dose of metformin.  He states that in the run of a week he only takes it twice daily about 1 day.  A1c is a bit elevated at 7.3, down slightly from 7.6 at last check.    He has polyneuropathy and takes Lyrica 3 times daily.  This controls the condition well.    He has depression and takes Cymbalta 60 mg daily.  Denies SI or HI and states that this medication works well for him.    He had elevated LFTs on most recent labs,  and .  He denies any excessive alcohol use.  He does have a history of slightly elevated liver enzymes and this is up from last check.    Blood pressure was high today at 159/97.  Denies headache, visual changes, shortness of breath or chest discomfort.  He states that he had a very stressful day at work today before this appointment and believes this is contributing.  He does have a blood pressure cuff at home but does not check it routinely.    Due for diabetic eye exam which he plans to schedule.  He has had his flu shot for this year already.    Denies development of any other new issues today.       The following portions of the patient's history were reviewed and updated as appropriate: allergies, current medications, past family history, past medical history, past social history, past  "surgical history and problem list.    Review of Systems   Constitutional: Negative for activity change, chills, fatigue, fever, unexpected weight gain and unexpected weight loss.   HENT: Negative for congestion, hearing loss, postnasal drip, sinus pressure, sneezing, sore throat, swollen glands and tinnitus.    Eyes: Negative for photophobia, pain and visual disturbance.   Respiratory: Negative for cough, chest tightness, shortness of breath and wheezing.    Cardiovascular: Negative for chest pain, palpitations and leg swelling.   Gastrointestinal: Negative for abdominal distention, abdominal pain, constipation, diarrhea, nausea and vomiting.   Endocrine: Negative for polydipsia, polyphagia and polyuria.   Genitourinary: Negative for dysuria, frequency, hematuria and urgency.   Neurological: Negative for dizziness, weakness, numbness and headache.   Psychiatric/Behavioral: Positive for stress. Negative for suicidal ideas. The patient is nervous/anxious.    All other systems reviewed and are negative.      Objective    /97 (BP Location: Left arm, Patient Position: Sitting, Cuff Size: Adult)   Pulse 109   Temp 98.9 °F (37.2 °C) (Oral)   Resp 16   Ht 172.7 cm (68\")   Wt 98.9 kg (218 lb)   SpO2 94%   BMI 33.15 kg/m²     Physical Exam  Vitals signs and nursing note reviewed.   Constitutional:       General: He is not in acute distress.     Appearance: Normal appearance. He is well-developed. He is obese. He is not ill-appearing, toxic-appearing or diaphoretic.   HENT:      Head: Atraumatic.      Right Ear: Tympanic membrane and ear canal normal.      Left Ear: Tympanic membrane and ear canal normal.      Nose: Nose normal.      Mouth/Throat:      Mouth: Mucous membranes are moist.      Pharynx: Oropharynx is clear.   Eyes:      General:         Right eye: No discharge.         Left eye: No discharge.      Conjunctiva/sclera: Conjunctivae normal.      Pupils: Pupils are equal, round, and reactive to light. "   Neck:      Musculoskeletal: Normal range of motion and neck supple. No neck rigidity or muscular tenderness.      Vascular: No carotid bruit.   Cardiovascular:      Rate and Rhythm: Normal rate and regular rhythm.      Pulses: Normal pulses.      Heart sounds: Normal heart sounds.      Comments: No peripheral edema  Pulmonary:      Effort: Pulmonary effort is normal. No respiratory distress.      Breath sounds: Normal breath sounds. No stridor. No wheezing, rhonchi or rales.   Chest:      Chest wall: No tenderness.   Abdominal:      General: Bowel sounds are normal. There is no distension.      Palpations: Abdomen is soft. There is no mass.      Tenderness: There is no abdominal tenderness. There is no left CVA tenderness, guarding or rebound.      Hernia: No hernia is present.   Musculoskeletal: Normal range of motion.   Lymphadenopathy:      Cervical: No cervical adenopathy.   Skin:     General: Skin is warm and dry.      Capillary Refill: Capillary refill takes less than 2 seconds.   Neurological:      General: No focal deficit present.      Mental Status: He is alert and oriented to person, place, and time. Mental status is at baseline.   Psychiatric:         Mood and Affect: Mood normal.         Behavior: Behavior normal.         Thought Content: Thought content normal.         Judgment: Judgment normal.       Current outpatient and discharge medications have been reconciled for the patient.  Reviewed by: HARSH Mays      Assessment/Plan   Brady was seen today for follow-up.    Diagnoses and all orders for this visit:    Mixed hyperlipidemia    Type 2 diabetes mellitus without complication, without long-term current use of insulin (CMS/HCC)    Depression, unspecified depression type    Polyneuropathy    Elevated LFTs  -     Hepatic Function Panel; Future    Need for hepatitis C screening test  -     Hepatitis C antibody; Future    -Hyperlipidemia: Continue diet modifications.  He has some elevated  liver enzymes and I want to recheck these to ensure normalization before restarting the atorvastatin.  We discussed diet changes and in 1 month we will recheck hepatic function panel.  If liver enzymes are down we will reinitiate atorvastatin.  I will send refill at that time.    -Type 2 diabetes: A1c uncontrolled at 7.3.  Discussed that goal is less than 7.  He is going to add the second dose to the day as he has been noncompliant with the twice daily dosing.  He is going to set an alarm in his phone for the morning dose.  Recheck of A1c in 3 months.    -Depression: Stable Cymbalta, continue current therapy.    -Elevated LFTs: As above mentioned we will recheck in 1 month and if safe will restart atorvastatin.  He denies any excessive alcohol use.  Will get hep C screen as well.    Follow-up PRN and in 1 month for lab appointment, recheck of hepatic function panel and hep C antibody.  Follow-up in person with PCP, Dr. mcmahan in 3 months for recheck of chronic conditions/routine health maintenance.

## 2020-10-12 ENCOUNTER — RESULTS ENCOUNTER (OUTPATIENT)
Dept: INTERNAL MEDICINE | Facility: CLINIC | Age: 55
End: 2020-10-12

## 2020-10-12 DIAGNOSIS — Z11.59 NEED FOR HEPATITIS C SCREENING TEST: ICD-10-CM

## 2020-11-05 ENCOUNTER — RESULTS ENCOUNTER (OUTPATIENT)
Dept: INTERNAL MEDICINE | Facility: CLINIC | Age: 55
End: 2020-11-05

## 2020-11-05 DIAGNOSIS — R79.89 ELEVATED LFTS: ICD-10-CM

## 2020-11-06 DIAGNOSIS — F32.A DEPRESSION, UNSPECIFIED DEPRESSION TYPE: ICD-10-CM

## 2020-11-06 RX ORDER — DULOXETIN HYDROCHLORIDE 60 MG/1
60 CAPSULE, DELAYED RELEASE ORAL DAILY
Qty: 90 CAPSULE | Refills: 1 | Status: SHIPPED | OUTPATIENT
Start: 2020-11-06 | End: 2021-04-19 | Stop reason: SDUPTHER

## 2021-02-01 ENCOUNTER — TELEPHONE (OUTPATIENT)
Dept: INTERNAL MEDICINE | Facility: CLINIC | Age: 56
End: 2021-02-01

## 2021-02-01 DIAGNOSIS — M79.605 PAIN IN BOTH LOWER EXTREMITIES: ICD-10-CM

## 2021-02-01 DIAGNOSIS — M79.604 PAIN IN BOTH LOWER EXTREMITIES: ICD-10-CM

## 2021-02-01 DIAGNOSIS — G62.9 POLYNEUROPATHY: ICD-10-CM

## 2021-02-01 RX ORDER — PREGABALIN 225 MG/1
CAPSULE ORAL
Qty: 90 CAPSULE | Refills: 1 | Status: SHIPPED | OUTPATIENT
Start: 2021-02-01 | End: 2021-04-01

## 2021-02-01 NOTE — TELEPHONE ENCOUNTER
PATIENT IS TOTALLY OUT OF HIS pregabalin (Lyrica) 225 MG capsule TODAY.    PLEASE ADVISE  [phone]

## 2021-03-26 ENCOUNTER — BULK ORDERING (OUTPATIENT)
Dept: CASE MANAGEMENT | Facility: OTHER | Age: 56
End: 2021-03-26

## 2021-03-26 DIAGNOSIS — Z23 IMMUNIZATION DUE: ICD-10-CM

## 2021-03-31 DIAGNOSIS — M79.605 PAIN IN BOTH LOWER EXTREMITIES: ICD-10-CM

## 2021-03-31 DIAGNOSIS — G62.9 POLYNEUROPATHY: ICD-10-CM

## 2021-03-31 DIAGNOSIS — M79.604 PAIN IN BOTH LOWER EXTREMITIES: ICD-10-CM

## 2021-04-01 RX ORDER — PREGABALIN 225 MG/1
CAPSULE ORAL
Qty: 90 CAPSULE | Refills: 0 | Status: SHIPPED | OUTPATIENT
Start: 2021-04-01 | End: 2021-04-19 | Stop reason: SDUPTHER

## 2021-04-19 ENCOUNTER — OFFICE VISIT (OUTPATIENT)
Dept: INTERNAL MEDICINE | Facility: CLINIC | Age: 56
End: 2021-04-19

## 2021-04-19 VITALS
OXYGEN SATURATION: 98 % | TEMPERATURE: 97.5 F | BODY MASS INDEX: 32.23 KG/M2 | DIASTOLIC BLOOD PRESSURE: 88 MMHG | SYSTOLIC BLOOD PRESSURE: 126 MMHG | HEART RATE: 101 BPM | WEIGHT: 212 LBS

## 2021-04-19 DIAGNOSIS — E78.2 MIXED HYPERLIPIDEMIA: ICD-10-CM

## 2021-04-19 DIAGNOSIS — E11.42 CONTROLLED TYPE 2 DIABETES MELLITUS WITH DIABETIC POLYNEUROPATHY, WITHOUT LONG-TERM CURRENT USE OF INSULIN (HCC): ICD-10-CM

## 2021-04-19 DIAGNOSIS — M79.604 PAIN IN BOTH LOWER EXTREMITIES: ICD-10-CM

## 2021-04-19 DIAGNOSIS — F32.A DEPRESSION, UNSPECIFIED DEPRESSION TYPE: ICD-10-CM

## 2021-04-19 DIAGNOSIS — M79.605 PAIN IN BOTH LOWER EXTREMITIES: ICD-10-CM

## 2021-04-19 DIAGNOSIS — G62.9 POLYNEUROPATHY: ICD-10-CM

## 2021-04-19 DIAGNOSIS — R41.3 MEMORY CHANGE: ICD-10-CM

## 2021-04-19 DIAGNOSIS — G47.33 OSA (OBSTRUCTIVE SLEEP APNEA): Primary | ICD-10-CM

## 2021-04-19 PROCEDURE — 99214 OFFICE O/P EST MOD 30 MIN: CPT | Performed by: FAMILY MEDICINE

## 2021-04-19 RX ORDER — LORATADINE 10 MG/1
10 TABLET ORAL AS NEEDED
COMMUNITY

## 2021-04-19 RX ORDER — PREGABALIN 225 MG/1
225 CAPSULE ORAL 3 TIMES DAILY
Qty: 90 CAPSULE | Refills: 5 | Status: SHIPPED | OUTPATIENT
Start: 2021-04-19 | End: 2021-11-16

## 2021-04-19 RX ORDER — FLUTICASONE PROPIONATE 50 MCG
2 SPRAY, SUSPENSION (ML) NASAL DAILY
Qty: 48 G | Refills: 3 | Status: SHIPPED | OUTPATIENT
Start: 2021-04-19

## 2021-04-19 RX ORDER — DULOXETIN HYDROCHLORIDE 60 MG/1
60 CAPSULE, DELAYED RELEASE ORAL DAILY
Qty: 90 CAPSULE | Refills: 1 | Status: SHIPPED | OUTPATIENT
Start: 2021-04-19 | End: 2021-10-22

## 2021-04-19 NOTE — PROGRESS NOTES
Answers for HPI/ROS submitted by the patient on 4/17/2021  What is the primary reason for your visit?: Diabetes    Chief Complaint  Diabetes    Subjective          Brady Mota presents to [facility]  Very pleasant gentleman here for recheck of diabetes type 2 with severe painful peripheral neuropathy presumed idiopathic based on control of his diabetes although could be related to diabetes type 2 is well previous extensive work-up for this.    His needs to go back to sleep lab he desires to go to sleep lab at [facility].  Sending her for evidence of obstructive sleep apnea for retest.  Otherwise he has memory deficit in the daytime which could be related to his obstructive sleep apnea.  We will have him follow-up with neurology.  He does not wish to have neuropsychological testing.    Diabetes  He has type 2 diabetes mellitus. No MedicAlert identification noted. Hypoglycemia symptoms include dizziness and sleepiness. Pertinent negatives for hypoglycemia include no confusion, headaches, hunger, mood changes, nervousness/anxiousness, pallor, seizures, speech difficulty, sweats or tremors. Associated symptoms include fatigue and foot paresthesias. Pertinent negatives for diabetes include no blurred vision, no chest pain, no foot ulcerations, no polydipsia, no polyphagia, no polyuria, no visual change, no weakness and no weight loss. Pertinent negatives for hypoglycemia complications include no blackouts, no hospitalization, no nocturnal hypoglycemia, no required assistance and no required glucagon injection. Symptoms are stable. Diabetic complications include peripheral neuropathy and PVD. Pertinent negatives for diabetic complications include no CVA, heart disease, impotence, nephropathy or retinopathy. Risk factors for coronary artery disease include family history, obesity, sedentary lifestyle and stress. Current diabetic treatment includes oral agent  (monotherapy). He is compliant with treatment most of the time. His weight is stable. Meal planning includes avoidance of concentrated sweets. He has not had a previous visit with a dietitian. He rarely participates in exercise. Blood glucose monitoring compliance is poor. His home blood glucose trend is fluctuating minimally. He sees a podiatrist.Eye exam is not current.       Objective   Vital Signs:   /88 (BP Location: Left arm, Patient Position: Sitting, Cuff Size: Adult)   Pulse 101   Temp 97.5 °F (36.4 °C) (Tympanic)   Wt 96.2 kg (212 lb)   SpO2 98%   BMI 32.23 kg/m²     Physical Exam  Vitals reviewed.   Constitutional:       Appearance: He is well-developed.   HENT:      Head: Normocephalic and atraumatic.      Right Ear: Tympanic membrane and external ear normal.      Left Ear: Tympanic membrane and external ear normal.   Eyes:      Conjunctiva/sclera: Conjunctivae normal.      Pupils: Pupils are equal, round, and reactive to light.   Neck:      Thyroid: No thyromegaly.      Vascular: No JVD.   Cardiovascular:      Rate and Rhythm: Normal rate and regular rhythm.      Heart sounds: Normal heart sounds.   Pulmonary:      Effort: Pulmonary effort is normal.      Breath sounds: Normal breath sounds.   Abdominal:      General: Bowel sounds are normal.      Palpations: Abdomen is soft.   Musculoskeletal:         General: Normal range of motion.      Cervical back: Normal range of motion and neck supple.   Lymphadenopathy:      Cervical: No cervical adenopathy.   Skin:     General: Skin is warm and dry.      Findings: No rash.   Neurological:      Mental Status: He is alert and oriented to person, place, and time.      Cranial Nerves: No cranial nerve deficit.      Coordination: Coordination normal.   Psychiatric:         Behavior: Behavior normal.         Thought Content: Thought content normal.         Judgment: Judgment normal.        Result Review :                 Assessment and Plan    Diagnoses  and all orders for this visit:    1. PASTOR (obstructive sleep apnea) (Primary)  Comments:  Referral for sleep lab  Orders:  -     Ambulatory Referral to Sleep Medicine  -     Folate  -     Vitamin B12  -     TSH  -     T4, Free  -     Comprehensive Metabolic Panel  -     CBC & Differential  -     Lipid Panel With / Chol / HDL Ratio  -     Hemoglobin A1c  -     MicroAlbumin, Urine, Random - Urine, Clean Catch    2. Memory change  Comments:  Referral to sleep lab and neurology.  Orders:  -     Ambulatory Referral to Neurology  -     Folate  -     Vitamin B12  -     TSH  -     T4, Free  -     Comprehensive Metabolic Panel  -     CBC & Differential  -     Lipid Panel With / Chol / HDL Ratio  -     Hemoglobin A1c  -     MicroAlbumin, Urine, Random - Urine, Clean Catch    3. Mixed hyperlipidemia  Comments:  Continue current treatment  Orders:  -     Folate  -     Vitamin B12  -     TSH  -     T4, Free  -     Comprehensive Metabolic Panel  -     CBC & Differential  -     Lipid Panel With / Chol / HDL Ratio  -     Hemoglobin A1c  -     MicroAlbumin, Urine, Random - Urine, Clean Catch    4. Controlled type 2 diabetes mellitus with diabetic polyneuropathy, without long-term current use of insulin (CMS/[facility])  Comments:  Double check labs continue current treatment  Orders:  -     Folate  -     Vitamin B12  -     TSH  -     T4, Free  -     Comprehensive Metabolic Panel  -     CBC & Differential  -     Lipid Panel With / Chol / HDL Ratio  -     Hemoglobin A1c  -     MicroAlbumin, Urine, Random - Urine, Clean Catch        Follow Up   Return in about 6 months (around 10/19/2021) for Recheck.  Patient was given instructions and counseling regarding his condition or for health maintenance advice. Please see specific information pulled into the AVS if appropriate.

## 2021-04-27 RX ORDER — METFORMIN HYDROCHLORIDE EXTENDED-RELEASE TABLETS 1000 MG/1
TABLET, FILM COATED, EXTENDED RELEASE ORAL
Qty: 180 TABLET | Refills: 1 | Status: SHIPPED | OUTPATIENT
Start: 2021-04-27 | End: 2021-05-03 | Stop reason: CLARIF

## 2021-05-03 DIAGNOSIS — E11.42 CONTROLLED TYPE 2 DIABETES MELLITUS WITH DIABETIC POLYNEUROPATHY, WITHOUT LONG-TERM CURRENT USE OF INSULIN (HCC): Primary | ICD-10-CM

## 2021-05-03 RX ORDER — METFORMIN HYDROCHLORIDE 500 MG/1
1000 TABLET, EXTENDED RELEASE ORAL 2 TIMES DAILY WITH MEALS
Qty: 360 TABLET | Refills: 1 | Status: SHIPPED | OUTPATIENT
Start: 2021-05-03 | End: 2021-06-16

## 2021-06-16 ENCOUNTER — TELEPHONE (OUTPATIENT)
Dept: INTERNAL MEDICINE | Facility: CLINIC | Age: 56
End: 2021-06-16

## 2021-06-16 NOTE — TELEPHONE ENCOUNTER
PATIENT CALLING STATING INSURANCE WILL NOT COVER metFORMIN ER (Glucophage XR) 500 MG 24 hr tablet HE WOULD LIKE TO GET A PRESCRIPTION SENT TO THE PHARMACY FOR THE REGULAR METFORMIN THAT IS NOT EXTENDED RELEASE HE STATED THEY WILL PAY FOR THE ORIGINAL. PATIENT CURRENTLY HAS 5 DAYS LEFT     PHARMACY: [id number] - [place], [place] - [address] AT [facility] & [address] - [phone]  - [phone]   [phone]

## 2021-06-22 ENCOUNTER — APPOINTMENT (OUTPATIENT)
Dept: SLEEP MEDICINE | Facility: HOSPITAL | Age: 56
End: 2021-06-22

## 2021-07-23 ENCOUNTER — OFFICE VISIT (OUTPATIENT)
Dept: NEUROLOGY | Facility: CLINIC | Age: 56
End: 2021-07-23

## 2021-07-23 ENCOUNTER — TELEPHONE (OUTPATIENT)
Dept: NEUROLOGY | Facility: CLINIC | Age: 56
End: 2021-07-23

## 2021-07-23 VITALS
WEIGHT: 210 LBS | OXYGEN SATURATION: 98 % | HEIGHT: 68 IN | DIASTOLIC BLOOD PRESSURE: 76 MMHG | BODY MASS INDEX: 31.83 KG/M2 | SYSTOLIC BLOOD PRESSURE: 120 MMHG | HEART RATE: 98 BPM

## 2021-07-23 DIAGNOSIS — R41.3 MEMORY LOSS: Primary | ICD-10-CM

## 2021-07-23 DIAGNOSIS — G47.33 OBSTRUCTIVE SLEEP APNEA: ICD-10-CM

## 2021-07-23 DIAGNOSIS — G47.31 COMPLEX SLEEP APNEA SYNDROME: ICD-10-CM

## 2021-07-23 PROCEDURE — 99204 OFFICE O/P NEW MOD 45 MIN: CPT | Performed by: PSYCHIATRY & NEUROLOGY

## 2021-07-23 RX ORDER — OMEPRAZOLE 40 MG/1
40 CAPSULE, DELAYED RELEASE ORAL DAILY
COMMUNITY

## 2021-07-23 NOTE — PROGRESS NOTES
Subjective:     Patient ID: Brady Mota is a 55 y.o. male.    Mr. Mota is a 55-year-old male with a history of diabetes, hyperlipidemia, idiopathic peripheral neuropathy, and sleep apnea who is seen in consultation at the request of Dr. Carter for a memory evaluation.  I reviewed the patient's records.  I reviewed the referring physician's note from April 19, 2021.  It reports that the patient was having some memory deficits.  There is concern it could have been related to his sleep apnea.  I reviewed patient's labs.  Back in September his B12 and TSH were normal.  The patient presents by himself today.  He reports that he has been having trouble with conversations and word finding difficulties.  He used to teach.  He says he may say the wrong word and this has been going on for about 4 years.  He says the symptoms are frustrating to him.  His wife jokes about his symptoms.  He feels like it is affected his work due to communication.  He reports he has trouble with concentration and maintaining focus.  He previously was working on a new team but likely is back with his old team for the past week.  He feels like this is improved his mood.  He has been off BiPAP for years because his mid humidifier broke.  Years ago he was snoring and therefore had a sleep study.  He reports to that showed sleep apnea.  He was initially on CPAP and then switched to BiPAP.  He saw ENT who did a UPPP that did not particularly help.  He reports that he has gained weight due to Lyrica.  He does drink a lot of caffeine and tends to doze on the couch after dinner.  He will go to bed around midnight.  This has been a more recent bedtime.  It takes 15 to 20 minutes to fall asleep.  He gets up at four to take his Lyrica and then gets out of bed around seven.  He has difficulty sleeping if he feels like he is under pressure.  A lot of times to watch TV before he goes to bed.  He naps every other day between five and 6:30 PM.  He thinks his  weight is up from his last sleep study.  The patient was seen by Dr. Wilcox and then referred to Dr. Azar for his peripheral neuropathy in the past.  He was followed by the [facility] from [phone].  He then was sent to [facility] in [place] for a second opinion.    The following portions of the patient's history were reviewed and updated as appropriate: allergies, current medications, past family history, past medical history, past social history, past surgical history and problem list.    Review of Systems     Objective:    Neurologic Exam    Physical Exam   **The patient is wearing a mask**  Constitutional:  Vital signs reviewed.  No apparent distress.  Well groomed.  Eyes:  No injection, no icterus.    Respiratory:  Normal effort.  Clear to auscultation bilaterally.  Cardiovascular:  Regular rate and rhythm.  No murmurs.  No carotid bruits. Symmetric radial pulses.  Musculoskeletal: Normal station.  Gait steady.  Normal arm swing.  Patient able to walk on toes.  Has difficulty with walking on heels and tandem gait..  Romberg negative.  Muscle tone and bulk normal in the bilateral upper and lower extremities.  Strength is 5/5 in the bilateral upper and lower extremities proximally and distally unless otherwise specified in the neurological exam.  Skin:  No rashes.  Warm, dry, and intact.  Psychiatric:  Good mood.  Normal affect.    Neurologic:  Mental status-  The patient scored a 29/30 on his [place] today.  He is able to read and write with no difficulties.  Cranial nerves- Pupils equally round and reactive to light with intact accomodation.  Visual fields intact.  Extraocular movements intact.  Facial sensation intact.   Hearing intact to finger-rub bilaterally.  SCM and trapezius are 5/5 bilaterally.    Motor-  See musculoskeletal above.  No tremor.  Reflexes- 2+ in the bilateral biceps, brachioradialis, patellar and absent achilles.  Toes down-going bilaterally.  Sensation-decreased  to pinprick in the left lower extremity and distal bilateral lower extremities.  Decreased vibration the right lower extremity and distal bilateral lower extremities.  Coordination- Intact to finger tapping and heel knee shin bilaterally.   Gait- See musculoskeletal exam above.       Assessment/Plan:    Mr. Mota is a 55-year-old male with history of diabetes, hyperlipidemia, idiopathic peripheral neuropathy, and sleep apnea who presents today for memory evaluation.    1.  Memory loss-the patient reports some word finding difficulties for the past 4 years.  His memory screening test today looks normal.  For further evaluation I recommend neuropsychological testing.    2.  Sleep apnea-we will have to request the records from his previous sleep studies.  If he has had a diagnostic study last 7 years then we will proceed with a CPAP titration study.       Problems Addressed this Visit     None      Visit Diagnoses     Memory loss    -  Primary    Relevant Orders    Ambulatory Referral to Neuropsychology    Obstructive sleep apnea          Diagnoses       Codes Comments    Memory loss    -  Primary ICD-10-CM: R41.3  ICD-9-CM: 780.93     Obstructive sleep apnea     ICD-10-CM: G47.33  ICD-9-CM: 327.23

## 2021-07-23 NOTE — TELEPHONE ENCOUNTER
Spoke to Valerie at [facility] ([phone]). Patient had 2 studies done in 2014 and she is faxing them over.

## 2021-07-23 NOTE — PATIENT INSTRUCTIONS
• At night, only use the bed and bedroom for sleep and sex only. Do not read, watch TV, eat, or worry in bed.   • Lie down only when you are sleepy.   • If you are unable to fall asleep, get up and go to another room. Avoid stimulating activities if you do get up.   • No clocks (if you tend to look at the clock throughout the night) or TV (or other electronic screens) in the bedroom.   • Avoid screens (TV, computer, tablet, cell phone) the hour prior to bedtime and during your sleep period.   • Establish a regular bedtime routine and a regular sleep/wake schedule. Keep this schedule 7 days a week.   • Do not eat or drink close to bedtime.   • Make sure your bedroom is dark, cool, and comfortable.   • If you need background noise, use a fan or a white noise machine.   • Avoid caffeine (regular coffee, decaf coffee, tea, sodas, chocolate, energy drinks).   • Avoid alcohol and nicotine close to bedtime.   • Exercise during the day, but not within 3 hours of bedtime.   • Avoid naps.   • Check if any of your night time medications may cause sleep problems.   • If you have heart burn or indigestion at night: avoid eating close to bedtime; elevated your head of bed; avoid spicy foods, tomatoes, citrus, alcohol, caffeine, and mint at night; take your antacid at night; sleep on your left side.   • If you have nasal stuffiness or congestion: consider taking an over the counter allergy medicine such as zyrtec, claritin, or allegra at night as well as a nasal spray such as Flonase or Nasacort.   • If you gasp for air at night, stop breathing in your sleep, have night sweats, snore, unrefreshing sleep, feel tired during the day, have morning headaches or dry mouth in the morning, you may be at risk for having problems with your breathing at night, likely sleep apnea. You may want to talk to your doctor about these symptoms.   • Consider trying melatonin at night. This can be purchased over the counter without a prescription.  I  recommend doses between 0.5-3 mg.  Try [employer], [employer], Life Extension (on Amazon) or REM Fresh brands.    • Consider the [employer] Sleep  nereyda or CBT-I  for your smart device.

## 2021-08-24 ENCOUNTER — TELEPHONE (OUTPATIENT)
Dept: NEUROLOGY | Facility: CLINIC | Age: 56
End: 2021-08-24

## 2021-08-24 ENCOUNTER — DOCUMENTATION (OUTPATIENT)
Dept: NEUROLOGY | Facility: CLINIC | Age: 56
End: 2021-08-24

## 2021-08-24 DIAGNOSIS — G47.31 CENTRAL SLEEP APNEA: ICD-10-CM

## 2021-08-24 DIAGNOSIS — G47.31 COMPLEX SLEEP APNEA SYNDROME: Primary | ICD-10-CM

## 2021-08-24 NOTE — TELEPHONE ENCOUNTER
Can you let this patient know that his sleep study has been over 7 years ago?  We will need to repeat 1.

## 2021-08-24 NOTE — PROGRESS NOTES
We received the results of the patient's sleep study.  The first study was done on August 15 of 2014.  His BMI was documented to be at 29.3.  His total sleep time was 156.1 minutes.  His arousal index was 56.1.  His AHI was 56.5.  It looks like it was a split-night study.  Due to complex and central apnea they scheduled him for a full night in lab titration study.  This was completed on September 19 of 2014.  He recommended BiPAP auto SV with a maximal IPAP pressure of 25, maximum EPAP pressure of 15, minimal EPAP pressure 5, maximum pressure support of 20, minimal pressure support of 4, an automatic rate and a rise time of 3.

## 2021-10-05 ENCOUNTER — TELEPHONE (OUTPATIENT)
Dept: NEUROLOGY | Facility: CLINIC | Age: 56
End: 2021-10-05

## 2021-10-05 NOTE — TELEPHONE ENCOUNTER
----- Message from Lili Oneal MD sent at 10/5/2021 10:27 AM EDT -----  Regarding: RE: STUDY DENIAL  I already replied to this message.  I changed the order to a diagnostic PSG instead.    ----- Message -----  From: Sera Ramirez MA  Sent: 10/5/2021  10:08 AM EDT  To: Lili Oneal MD  Subject: FW: STUDY DENIAL                                 Please advise  ----- Message -----  From: Rosy Hayes MA  Sent: 10/5/2021  10:04 AM EDT  To: Sera Ramirez MA  Subject: FW: STUDY DENIAL                                 Please review. This was sent by the sleep lab. I usually forward on to Dr. Oneal to see if she wants to do an appeal or not.    Thank you.  ----- Message -----  From: Mara Bowers RegSched Rep  Sent: 10/4/2021   1:17 PM EDT  To: Lili Oneal MD, #  Subject: STUDY DENIAL                                     [facility] HAS DENIED the split night study. We sent in all your notes, including the chart note about CSA.    They said you have 21 days to appeal. The number to call [phone], using reference number  [id number].    Please advise    Thanks  Mara

## 2021-10-21 DIAGNOSIS — F32.A DEPRESSION, UNSPECIFIED DEPRESSION TYPE: ICD-10-CM

## 2021-10-22 RX ORDER — DULOXETIN HYDROCHLORIDE 60 MG/1
60 CAPSULE, DELAYED RELEASE ORAL DAILY
Qty: 90 CAPSULE | Refills: 1 | Status: SHIPPED | OUTPATIENT
Start: 2021-10-22 | End: 2021-12-07 | Stop reason: SDUPTHER

## 2021-11-05 ENCOUNTER — HOSPITAL ENCOUNTER (OUTPATIENT)
Dept: SLEEP MEDICINE | Facility: HOSPITAL | Age: 56
Discharge: HOME | End: 2021-11-05
Admitting: PSYCHIATRY & NEUROLOGY

## 2021-11-05 DIAGNOSIS — G47.31 COMPLEX SLEEP APNEA SYNDROME: ICD-10-CM

## 2021-11-05 PROCEDURE — 95810 POLYSOM 6/> YRS 4/> PARAM: CPT | Performed by: PSYCHIATRY & NEUROLOGY

## 2021-11-05 PROCEDURE — 95810 POLYSOM 6/> YRS 4/> PARAM: CPT

## 2021-11-12 DIAGNOSIS — M79.605 PAIN IN BOTH LOWER EXTREMITIES: ICD-10-CM

## 2021-11-12 DIAGNOSIS — G62.9 POLYNEUROPATHY: ICD-10-CM

## 2021-11-12 DIAGNOSIS — M79.604 PAIN IN BOTH LOWER EXTREMITIES: ICD-10-CM

## 2021-11-16 RX ORDER — PREGABALIN 225 MG/1
CAPSULE ORAL
Qty: 90 CAPSULE | Refills: 5 | Status: SHIPPED | OUTPATIENT
Start: 2021-11-16 | End: 2022-05-27 | Stop reason: SDUPTHER

## 2021-11-16 NOTE — TELEPHONE ENCOUNTER
PATIENT IS REQUESTING A CALL BACK TO SEE WHEN HIS PREGABLIN WILL BE CALLED IN. STATES HE HAS ONE DAY LEFT.

## 2021-12-07 ENCOUNTER — OFFICE VISIT (OUTPATIENT)
Dept: INTERNAL MEDICINE | Facility: CLINIC | Age: 56
End: 2021-12-07

## 2021-12-07 VITALS
DIASTOLIC BLOOD PRESSURE: 81 MMHG | WEIGHT: 206 LBS | SYSTOLIC BLOOD PRESSURE: 126 MMHG | HEART RATE: 96 BPM | OXYGEN SATURATION: 98 % | HEIGHT: 68 IN | BODY MASS INDEX: 31.22 KG/M2 | TEMPERATURE: 97.1 F

## 2021-12-07 DIAGNOSIS — G47.33 OSA (OBSTRUCTIVE SLEEP APNEA): ICD-10-CM

## 2021-12-07 DIAGNOSIS — R41.3 MEMORY CHANGE: ICD-10-CM

## 2021-12-07 DIAGNOSIS — F32.A DEPRESSION, UNSPECIFIED DEPRESSION TYPE: ICD-10-CM

## 2021-12-07 DIAGNOSIS — G62.9 POLYNEUROPATHY: ICD-10-CM

## 2021-12-07 DIAGNOSIS — E78.2 MIXED HYPERLIPIDEMIA: ICD-10-CM

## 2021-12-07 DIAGNOSIS — E11.42 CONTROLLED TYPE 2 DIABETES MELLITUS WITH DIABETIC POLYNEUROPATHY, WITHOUT LONG-TERM CURRENT USE OF INSULIN (HCC): Primary | ICD-10-CM

## 2021-12-07 PROCEDURE — 99214 OFFICE O/P EST MOD 30 MIN: CPT | Performed by: FAMILY MEDICINE

## 2021-12-07 RX ORDER — DULOXETIN HYDROCHLORIDE 60 MG/1
60 CAPSULE, DELAYED RELEASE ORAL DAILY
Qty: 90 CAPSULE | Refills: 1 | Status: SHIPPED | OUTPATIENT
Start: 2021-12-07 | End: 2022-09-30 | Stop reason: SDUPTHER

## 2021-12-07 NOTE — PROGRESS NOTES
Chief Complaint  Follow-up    Subjective          Brady Mota presents to [facility]  Pleasant gentleman with a history of type 2 diabetes with oral Metformin as primary treatment.  Discussion of history of fatigue foot paresthesias f and general fatigue are reviewed.  Symptomatically he is doing fairly well. He has gradually lost weight over the past couple years. We discussed anti-inflammatory diet.    Diabetes  He has type 2 diabetes mellitus. No MedicAlert identification noted. The initial diagnosis of diabetes was made 7 years ago. Hypoglycemia symptoms include sleepiness. Pertinent negatives for hypoglycemia include no confusion, dizziness, headaches, hunger, mood changes, nervousness/anxiousness, pallor, seizures, speech difficulty, sweats or tremors. Associated symptoms include fatigue, foot paresthesias, foot ulcerations and weakness. Pertinent negatives for diabetes include no blurred vision, no chest pain, no polydipsia, no polyphagia, no polyuria, no visual change and no weight loss. Pertinent negatives for hypoglycemia complications include no blackouts, no hospitalization, no nocturnal hypoglycemia, no required assistance and no required glucagon injection. Symptoms are stable. Diabetic complications include peripheral neuropathy and PVD. Pertinent negatives for diabetic complications include no CVA, heart disease, impotence, nephropathy or retinopathy. Risk factors for coronary artery disease include dyslipidemia, family history, obesity, sedentary lifestyle and stress. Current diabetic treatment includes diet and oral agent (monotherapy). He is compliant with treatment most of the time. His weight is decreasing steadily. He is following a generally unhealthy diet. Meal planning includes calorie counting. He has not had a previous visit with a dietitian. He rarely participates in exercise. There is no compliance with monitoring of blood glucose. His home blood  "glucose trend is fluctuating minimally. He sees a podiatrist.Eye exam is current.       Objective   Vital Signs:   /81   Pulse 96   Temp 97.1 °F (36.2 °C) (Temporal)   Ht 172.7 cm (67.99\")   Wt 93.4 kg (206 lb)   SpO2 98%   BMI 31.33 kg/m²     Physical Exam  Vitals reviewed.   Constitutional:       Appearance: He is well-developed.   HENT:      Head: Normocephalic and atraumatic.      Right Ear: Tympanic membrane and external ear normal.      Left Ear: Tympanic membrane and external ear normal.      Nose: Nose normal.   Eyes:      Conjunctiva/sclera: Conjunctivae normal.      Pupils: Pupils are equal, round, and reactive to light.   Neck:      Thyroid: No thyromegaly.      Vascular: No JVD.   Cardiovascular:      Rate and Rhythm: Normal rate and regular rhythm.      Heart sounds: Normal heart sounds.   Pulmonary:      Effort: Pulmonary effort is normal.      Breath sounds: Normal breath sounds.   Abdominal:      General: Bowel sounds are normal.      Palpations: Abdomen is soft.   Musculoskeletal:         General: Normal range of motion.      Cervical back: Normal range of motion and neck supple.   Lymphadenopathy:      Cervical: No cervical adenopathy.   Skin:     General: Skin is warm and dry.      Findings: No rash.   Neurological:      Mental Status: He is alert and oriented to person, place, and time.      Cranial Nerves: No cranial nerve deficit.      Sensory: Sensory deficit present.      Motor: Motor function is intact.      Coordination: Coordination abnormal.      Gait: Gait is intact.   Psychiatric:         Behavior: Behavior normal.         Thought Content: Thought content normal.         Judgment: Judgment normal.        Result Review :   The following data was reviewed by: Usama Carter Jr, MD on 12/07/2021:                Assessment and Plan    Diagnoses and all orders for this visit:    1. Controlled type 2 diabetes mellitus with diabetic polyneuropathy, without long-term current use of " insulin (HCC) (Primary)  Comments:  Metformin 500 mg 2 tablets twice daily  Orders:  -     MicroAlbumin, Urine, Random - Urine, Clean Catch    2. Mixed hyperlipidemia  Comments:  Atorvastatin 20 mg daily    3. Polyneuropathy  Comments:  Lyrica 225 mg 3 times daily    4. Memory change    5. Depression, unspecified depression type  Comments:  Duloxetine 60 mg daily  Orders:  -     DULoxetine (CYMBALTA) 60 MG capsule; Take 1 capsule by mouth Daily.  Dispense: 90 capsule; Refill: 1    6. PASTOR (obstructive sleep apnea)  Comments:  Work-up per sleep medicine        Follow Up   Return in about 6 months (around 6/7/2022) for Annual physical.  Patient was given instructions and counseling regarding his condition or for health maintenance advice. Please see specific information pulled into the AVS if appropriate.       Answers for HPI/ROS submitted by the patient on 12/5/2021  What is the primary reason for your visit?: Diabetes

## 2021-12-08 LAB
ALBUMIN SERPL-MCNC: 4.5 G/DL (ref 3.8–4.9)
ALBUMIN/GLOB SERPL: 1.7 {RATIO} (ref 1.2–2.2)
ALP SERPL-CCNC: 79 IU/L (ref 44–121)
ALT SERPL-CCNC: 51 IU/L (ref 0–44)
AST SERPL-CCNC: 50 IU/L (ref 0–40)
BASOPHILS # BLD AUTO: 0 X10E3/UL (ref 0–0.2)
BASOPHILS NFR BLD AUTO: 1 %
BILIRUB SERPL-MCNC: 0.3 MG/DL (ref 0–1.2)
BUN SERPL-MCNC: 12 MG/DL (ref 6–24)
BUN/CREAT SERPL: 17 (ref 9–20)
CALCIUM SERPL-MCNC: 10.6 MG/DL (ref 8.7–10.2)
CHLORIDE SERPL-SCNC: 96 MMOL/L (ref 96–106)
CHOLEST SERPL-MCNC: 224 MG/DL (ref 100–199)
CHOLEST/HDLC SERPL: 8.3 RATIO (ref 0–5)
CO2 SERPL-SCNC: 22 MMOL/L (ref 20–29)
CREAT SERPL-MCNC: 0.69 MG/DL (ref 0.76–1.27)
EOSINOPHIL # BLD AUTO: 0.4 X10E3/UL (ref 0–0.4)
EOSINOPHIL NFR BLD AUTO: 9 %
ERYTHROCYTE [DISTWIDTH] IN BLOOD BY AUTOMATED COUNT: 12.5 % (ref 11.6–15.4)
FOLATE SERPL-MCNC: >20 NG/ML
GLOBULIN SER CALC-MCNC: 2.7 G/DL (ref 1.5–4.5)
GLUCOSE SERPL-MCNC: 192 MG/DL (ref 65–99)
HBA1C MFR BLD: 7.1 % (ref 4.8–5.6)
HCT VFR BLD AUTO: 48.3 % (ref 37.5–51)
HDLC SERPL-MCNC: 27 MG/DL
HGB BLD-MCNC: 17.1 G/DL (ref 13–17.7)
IMM GRANULOCYTES # BLD AUTO: 0 X10E3/UL (ref 0–0.1)
IMM GRANULOCYTES NFR BLD AUTO: 0 %
LDLC SERPL CALC-MCNC: 131 MG/DL (ref 0–99)
LYMPHOCYTES # BLD AUTO: 1.3 X10E3/UL (ref 0.7–3.1)
LYMPHOCYTES NFR BLD AUTO: 32 %
MCH RBC QN AUTO: 31.1 PG (ref 26.6–33)
MCHC RBC AUTO-ENTMCNC: 35.4 G/DL (ref 31.5–35.7)
MCV RBC AUTO: 88 FL (ref 79–97)
MONOCYTES # BLD AUTO: 0.7 X10E3/UL (ref 0.1–0.9)
MONOCYTES NFR BLD AUTO: 18 %
NEUTROPHILS # BLD AUTO: 1.6 X10E3/UL (ref 1.4–7)
NEUTROPHILS NFR BLD AUTO: 40 %
PLATELET # BLD AUTO: 150 X10E3/UL (ref 150–450)
POTASSIUM SERPL-SCNC: 4.6 MMOL/L (ref 3.5–5.2)
PROT SERPL-MCNC: 7.2 G/DL (ref 6–8.5)
RBC # BLD AUTO: 5.49 X10E6/UL (ref 4.14–5.8)
SODIUM SERPL-SCNC: 140 MMOL/L (ref 134–144)
T4 FREE SERPL-MCNC: 1.18 NG/DL (ref 0.82–1.77)
TRIGL SERPL-MCNC: 368 MG/DL (ref 0–149)
TSH SERPL DL<=0.005 MIU/L-ACNC: 1.13 UIU/ML (ref 0.45–4.5)
VIT B12 SERPL-MCNC: 1108 PG/ML (ref 232–1245)
VLDLC SERPL CALC-MCNC: 66 MG/DL (ref 5–40)
WBC # BLD AUTO: 4 X10E3/UL (ref 3.4–10.8)

## 2021-12-13 ENCOUNTER — TELEPHONE (OUTPATIENT)
Dept: INTERNAL MEDICINE | Facility: CLINIC | Age: 56
End: 2021-12-13

## 2021-12-13 NOTE — TELEPHONE ENCOUNTER
MARGY ATTEMPTED THE WARM TRANSFER PROCESS, BUT WAS UNSUCCESSFUL.    Caller: Brady Mota    Relationship: Self    Best call back number: [phone]    What is the best time to reach you: ANYTIME    Who are you requesting to speak with (clinical staff, provider,  specific staff member): PCP OR MEDICAL ASSISTANT    What was the call regarding: THE PATIENT RECENTLY READ THE NOTES POSTED ON HIS LAB RESULTS. HE STATES THAT HE IS NO LONGER ON LIPITOR. A NURSE PRACTITIONER WITHIN THE OFFICE TOOK HIM OFF OF HIS LIPITOR MEDICATION DUE TO HIGH BLOOD PRESSURE. THE PATIENT WILL NEED A NEW PRESCRIPTION FOR THIS IF HIS PCP WANTS HIM TO CONTINUE TAKING THE MEDICATION.    Do you require a callback: YES, PLEASE

## 2022-01-01 RX ORDER — ROSUVASTATIN CALCIUM 5 MG/1
5 TABLET, COATED ORAL DAILY
Qty: 90 TABLET | Refills: 3 | Status: SHIPPED | OUTPATIENT
Start: 2022-01-01 | End: 2022-09-30 | Stop reason: SDUPTHER

## 2022-01-02 NOTE — TELEPHONE ENCOUNTER
I reviewed his records and decided to try a tiny dose of crestor 5 mg daily which is better tolerated than lipitor.

## 2022-04-04 ENCOUNTER — OFFICE VISIT (OUTPATIENT)
Dept: INTERNAL MEDICINE | Facility: CLINIC | Age: 57
End: 2022-04-04

## 2022-04-04 VITALS
BODY MASS INDEX: 31.67 KG/M2 | SYSTOLIC BLOOD PRESSURE: 127 MMHG | HEART RATE: 105 BPM | OXYGEN SATURATION: 96 % | WEIGHT: 209 LBS | TEMPERATURE: 97.3 F | DIASTOLIC BLOOD PRESSURE: 84 MMHG | HEIGHT: 68 IN | RESPIRATION RATE: 17 BRPM

## 2022-04-04 DIAGNOSIS — G62.9 POLYNEUROPATHY: ICD-10-CM

## 2022-04-04 DIAGNOSIS — M75.80 ROTATOR CUFF TENDINITIS, UNSPECIFIED LATERALITY: Primary | ICD-10-CM

## 2022-04-04 PROCEDURE — 99213 OFFICE O/P EST LOW 20 MIN: CPT | Performed by: FAMILY MEDICINE

## 2022-04-04 RX ORDER — DICLOFENAC 16.05 MG/ML
10 SOLUTION TOPICAL 3 TIMES DAILY PRN
Qty: 150 ML | Refills: 2 | Status: SHIPPED | OUTPATIENT
Start: 2022-04-04 | End: 2022-04-14

## 2022-04-04 RX ORDER — PILOCARPINE HYDROCHLORIDE 12.5 MG/ML
1 SOLUTION/ DROPS OPHTHALMIC DAILY
COMMUNITY
Start: 2022-03-25

## 2022-04-04 NOTE — PROGRESS NOTES
"Chief Complaint  Shoulder Pain (Pt presents here today with R shoulder.)    Subjective          Brady Mota presents to [facility]  Has had at least 6 weeks impingement of the right shoulder.  With full range of motion but a popping sound when he lowers the shoulder laterally.  This is associate with pain as well.  Otherwise the left shoulder has pain when he carry something heavy in the left hand with pain in the anterior portion of the shoulder and clavicle.  There has been no certain injury occur.      Objective   Vital Signs:   /84 (BP Location: Left arm, Patient Position: Sitting, Cuff Size: Large Adult)   Pulse 105   Temp 97.3 °F (36.3 °C)   Resp 17   Ht 172.7 cm (67.99\")   Wt 94.8 kg (209 lb)   SpO2 96%   BMI 31.79 kg/m²            Physical Exam  Vitals reviewed.   Constitutional:       Appearance: He is well-developed.   HENT:      Head: Normocephalic and atraumatic.      Right Ear: Tympanic membrane and external ear normal.      Left Ear: Tympanic membrane and external ear normal.   Eyes:      Conjunctiva/sclera: Conjunctivae normal.      Pupils: Pupils are equal, round, and reactive to light.   Neck:      Thyroid: No thyromegaly.      Vascular: No JVD.   Cardiovascular:      Rate and Rhythm: Normal rate and regular rhythm.      Heart sounds: Normal heart sounds.   Pulmonary:      Effort: Pulmonary effort is normal.      Breath sounds: Normal breath sounds.   Abdominal:      General: Bowel sounds are normal.      Palpations: Abdomen is soft.   Musculoskeletal:      Right shoulder: Tenderness present. Decreased range of motion.        Arms:       Cervical back: Normal range of motion and neck supple.   Lymphadenopathy:      Cervical: No cervical adenopathy.   Skin:     General: Skin is warm and dry.      Findings: No rash.   Neurological:      Mental Status: He is alert and oriented to person, place, and time.      Cranial Nerves: No cranial nerve deficit.      " Coordination: Coordination normal.   Psychiatric:         Behavior: Behavior normal.         Thought Content: Thought content normal.         Judgment: Judgment normal.        Result Review :                 Assessment and Plan    Diagnoses and all orders for this visit:    1. Rotator cuff tendinitis, unspecified laterality (Primary)  -     Ambulatory Referral to Sports Medicine    2. Polyneuropathy    Other orders  -     Diclofenac Sodium 1.5 % solution; Apply 10 drops topically 3 (Three) Times a Day As Needed (shoulder pain).  Dispense: 150 mL; Refill: 2        Follow Up   No follow-ups on file.  Patient was given instructions and counseling regarding his condition or for health maintenance advice. Please see specific information pulled into the AVS if appropriate.       Answers for HPI/ROS submitted by the patient on 4/4/2022  What is the primary reason for your visit?: Other  Please describe your symptoms.: Pain in right shoulder., Pain in left clavicle  Have you had these symptoms before?: No  How long have you been having these symptoms?: Greater than 2 weeks

## 2022-04-12 ENCOUNTER — TELEPHONE (OUTPATIENT)
Dept: INTERNAL MEDICINE | Facility: CLINIC | Age: 57
End: 2022-04-12

## 2022-04-12 NOTE — TELEPHONE ENCOUNTER
Caller: Brady Mota    Relationship: Self    Best call back number: [phone]    What medication are you requesting: CONSTIPATION MEDICATION    What are your current symptoms:  CONSTIPATION, BEEN USING GENERIC OVER THE COUNTER MEDS BUT NOT HELPING    How long have you been experiencing symptoms: THREE DAYS    Have you had these symptoms before:    [] Yes  [x] No    Have you been treated for these symptoms before:   [] Yes  [x] No    If a prescription is needed, what is your preferred pharmacy and phone number: [id number] - [facility] [address] [facility] [address] [facility] [phone] [facility] [phone]      Additional notes: PLEASE ADVISE-PATIENT WOULD LIKE A CALLBACK WITH DETAILS ABOUT MEDICATION THAT IS BEING SENT AND NEXT STEPS. OR PLEASE SEND A [employer] MESSAGE.

## 2022-04-13 NOTE — TELEPHONE ENCOUNTER
Caller: Brady Mota    Relationship: Self    Best call back number: [phone]    What is the best time to reach you: ANY TIME    What was the call regarding: PATIENT CALLED TO CHECK ON THIS REQUEST    PLEASE ADVISE    Do you require a callback: YES

## 2022-04-13 NOTE — TELEPHONE ENCOUNTER
I would have him get OTC magnesium citrate/1 bottle and see if that works. If not, yes he needs to be seen. Thanks, Jesusita

## 2022-04-14 ENCOUNTER — OFFICE VISIT (OUTPATIENT)
Dept: INTERNAL MEDICINE | Facility: CLINIC | Age: 57
End: 2022-04-14

## 2022-04-14 ENCOUNTER — HOSPITAL ENCOUNTER (OUTPATIENT)
Dept: GENERAL RADIOLOGY | Facility: HOSPITAL | Age: 57
Discharge: HOME | End: 2022-04-14
Admitting: NURSE PRACTITIONER

## 2022-04-14 VITALS
TEMPERATURE: 97.3 F | SYSTOLIC BLOOD PRESSURE: 128 MMHG | WEIGHT: 204 LBS | BODY MASS INDEX: 30.92 KG/M2 | HEIGHT: 68 IN | OXYGEN SATURATION: 97 % | DIASTOLIC BLOOD PRESSURE: 88 MMHG | HEART RATE: 100 BPM

## 2022-04-14 DIAGNOSIS — K59.00 CONSTIPATION, UNSPECIFIED CONSTIPATION TYPE: Primary | ICD-10-CM

## 2022-04-14 DIAGNOSIS — R19.4 CHANGE IN BOWEL HABIT: ICD-10-CM

## 2022-04-14 DIAGNOSIS — Z12.11 COLON CANCER SCREENING: ICD-10-CM

## 2022-04-14 PROBLEM — M15.9 GENERALIZED OA: Status: ACTIVE | Noted: 2022-04-14

## 2022-04-14 PROBLEM — K76.0 NAFLD (NONALCOHOLIC FATTY LIVER DISEASE): Status: ACTIVE | Noted: 2022-04-14

## 2022-04-14 PROBLEM — K58.9 IBS (IRRITABLE BOWEL SYNDROME): Status: ACTIVE | Noted: 2022-04-14

## 2022-04-14 PROCEDURE — 74019 RADEX ABDOMEN 2 VIEWS: CPT

## 2022-04-14 PROCEDURE — 99213 OFFICE O/P EST LOW 20 MIN: CPT | Performed by: NURSE PRACTITIONER

## 2022-04-14 NOTE — PROGRESS NOTES
"Chief Complaint  Constipation    Subjective          Brady Mota presents to [facility]  History of Present Illness    Patient is a pleasant 56-year-old male who typically sees Dr. Carter here in the office.  Patient is new to me he presents to the clinic today with concerns of constipation.  Patient states that over the last week he has been constipated.  He did take some magnesium citrate last night and he has had several bowel movements this morning.  Patient denies any abdominal pain, chills, fever, nausea or vomiting at this time.  Patient states in the past he has had a period of constipation/history.    Objective   Vital Signs:   /88 (BP Location: Left arm, Patient Position: Sitting, Cuff Size: Adult)   Pulse 100   Temp 97.3 °F (36.3 °C)   Ht 172.7 cm (67.99\")   Wt 92.5 kg (204 lb)   SpO2 97%   BMI 31.03 kg/m²            Physical Exam  Vitals and nursing note reviewed.   Constitutional:       Appearance: Normal appearance.   HENT:      Nose: Nose normal.   Eyes:      Pupils: Pupils are equal, round, and reactive to light.   Cardiovascular:      Rate and Rhythm: Normal rate and regular rhythm.      Pulses: Normal pulses.      Heart sounds: Normal heart sounds.      Comments: No peripheral edema noted.   Pulmonary:      Effort: Pulmonary effort is normal. No respiratory distress.      Breath sounds: Normal breath sounds. No stridor. No wheezing, rhonchi or rales.   Chest:      Chest wall: No tenderness.   Abdominal:      General: Bowel sounds are normal. There is distension.      Tenderness: There is no abdominal tenderness. There is no rebound.      Comments: Hx of constipation x 7 days.   Musculoskeletal:         General: Normal range of motion.   Skin:     General: Skin is warm.      Capillary Refill: Capillary refill takes less than 2 seconds.   Neurological:      Mental Status: He is alert and oriented to person, place, and time.   Psychiatric:         Behavior: " Behavior normal.        Result Review :            XR Abdomen Flat & Upright (04/14/2022 3:35 PM)  Comprehensive Metabolic Panel (12/07/2021 12:00 AM)  CBC & Differential (12/07/2021 12:00 AM)  Lipid Panel With / Chol / HDL Ratio (12/07/2021 12:00 AM)  Hemoglobin A1c (12/07/2021 12:00 AM)       Assessment and Plan    Diagnoses and all orders for this visit:    1. Constipation, unspecified constipation type (Primary)  -     Ambulatory Referral to Gastroenterology  -     XR Abdomen Flat & Upright    2. Change in bowel habit  -     Ambulatory Referral to Gastroenterology  -     XR Abdomen Flat & Upright    3. Colon cancer screening  -     Ambulatory Referral to Gastroenterology  -     XR Abdomen Flat & Upright      Patient will go to the hospital and get an x-ray of his abdomen today.  We will contact him with those results.  Patient is aware if he has any worsening of symptoms such as abdominal pain, nausea or vomiting with chills or fever he knows to go the emergency room.  We have also sent over a referral to gastroenterology at this time due to his change in bowel patterns and his need for colonoscopy.  Patient agrees with this treatment plan at this time.      Follow Up   Return if symptoms worsen or fail to improve.  Patient was given instructions and counseling regarding his condition or for health maintenance advice. Please see specific information pulled into the AVS if appropriate.

## 2022-05-02 ENCOUNTER — PRE-PROCEDURE SCREENING (OUTPATIENT)
Dept: GASTROENTEROLOGY | Facility: CLINIC | Age: 57
End: 2022-05-02

## 2022-05-04 ENCOUNTER — OFFICE VISIT (OUTPATIENT)
Dept: SPORTS MEDICINE | Facility: CLINIC | Age: 57
End: 2022-05-04

## 2022-05-04 ENCOUNTER — HOSPITAL ENCOUNTER (OUTPATIENT)
Dept: GENERAL RADIOLOGY | Facility: HOSPITAL | Age: 57
Discharge: HOME | End: 2022-05-04

## 2022-05-04 VITALS
OXYGEN SATURATION: 99 % | HEIGHT: 68 IN | HEART RATE: 88 BPM | SYSTOLIC BLOOD PRESSURE: 132 MMHG | WEIGHT: 204 LBS | TEMPERATURE: 97.6 F | BODY MASS INDEX: 30.92 KG/M2 | RESPIRATION RATE: 16 BRPM | DIASTOLIC BLOOD PRESSURE: 82 MMHG

## 2022-05-04 DIAGNOSIS — M25.511 CHRONIC PAIN OF BOTH SHOULDERS: Primary | ICD-10-CM

## 2022-05-04 DIAGNOSIS — M25.512 CHRONIC PAIN OF BOTH SHOULDERS: Primary | ICD-10-CM

## 2022-05-04 DIAGNOSIS — M75.41 IMPINGEMENT SYNDROME OF RIGHT SHOULDER: ICD-10-CM

## 2022-05-04 DIAGNOSIS — G89.29 CHRONIC PAIN OF BOTH SHOULDERS: Primary | ICD-10-CM

## 2022-05-04 PROCEDURE — 73000 X-RAY EXAM OF COLLAR BONE: CPT

## 2022-05-04 PROCEDURE — 20610 DRAIN/INJ JOINT/BURSA W/O US: CPT | Performed by: FAMILY MEDICINE

## 2022-05-04 PROCEDURE — 73030 X-RAY EXAM OF SHOULDER: CPT

## 2022-05-04 PROCEDURE — 99214 OFFICE O/P EST MOD 30 MIN: CPT | Performed by: FAMILY MEDICINE

## 2022-05-04 RX ORDER — TRIAMCINOLONE ACETONIDE 40 MG/ML
40 INJECTION, SUSPENSION INTRA-ARTICULAR; INTRAMUSCULAR ONCE
Status: COMPLETED | OUTPATIENT
Start: 2022-05-04 | End: 2022-05-04

## 2022-05-04 RX ADMIN — TRIAMCINOLONE ACETONIDE 40 MG: 40 INJECTION, SUSPENSION INTRA-ARTICULAR; INTRAMUSCULAR at 12:56

## 2022-05-04 NOTE — PROGRESS NOTES
"Brady is a 56 y.o. year old male here today for consultation requested by Dr. Carter    Chief Complaint   Patient presents with   • Shoulder Pain   • bilateral shoulder     Bilateral shoulder pain for several months, KNI.        History of Present Illness  Here today for pain in the right shoulder, insidious onset starting about 3 months ago.  Worse with abduction and internal rotation.  Sharp pain, rates to the upper arm, feels some swelling sensation, moderately severe.    Also complains of some more chronic pain in the left proximal clavicle that started a year or 2 ago.  Having some pain radiating out laterally now.  Feels like there is some aching in the proximal clavicle that makes it feel like his arm is heavy.    RHD      I have reviewed the patient's medical, family, and social history in detail and updated the computerized patient record.    Review of Systems   Constitutional: Negative for fever.   Musculoskeletal:        Per HPI   Skin: Negative for wound.   Neurological: Positive for numbness (chronic neuropathy).   All other systems reviewed and are negative.      /82 (BP Location: Left arm, Patient Position: Sitting, Cuff Size: Adult)   Pulse 88   Temp 97.6 °F (36.4 °C)   Resp 16   Ht 172.7 cm (67.99\")   Wt 92.5 kg (204 lb)   SpO2 99%   BMI 31.03 kg/m²        Physical Exam    Vital signs reviewed.   General: No acute distress.  Eyes: conjunctiva clear; pupils equally round and reactive  ENT: external ears and nose atraumatic; oropharynx clear  CV: no peripheral edema, 2+ distal pulses  Resp: normal respiratory effort, no use of accessory muscles  Skin: no rashes or wounds; normal turgor  Psych: mood and affect appropriate; recent and remote memory intact  Neuro: sensation to light touch intact    MSK Exam:  Right Shoulder Exam     Tenderness   Right shoulder tenderness location: subacromial space.    Range of Motion   The patient has normal right shoulder ROM.  Right shoulder active " "abduction: painful.   Right shoulder internal rotation 0 degrees: painful.     Muscle Strength   The patient has normal right shoulder strength (pain with empty can).    Tests   Galindo test: positive  Impingement: positive  Drop arm: negative    Other   Erythema: absent  Sensation: normal      Left Shoulder Exam     Tenderness   The patient is experiencing no tenderness.     Range of Motion   The patient has normal left shoulder ROM.    Muscle Strength   The patient has normal left shoulder strength.    Tests   Galindo test: negative  Impingement: negative          Bilateral Shoulder X-Ray  Indication: Pain  Views: AP Internal and External Rotation; 2V L Clavicle    Findings:  No fracture  No bony lesion  Normal soft tissues  Moderate AC joint arthritis bilaterally.   Left clavicle view shows possible arthrosis at the articulation of the first rib to the upper sternum    No prior studies were available for comparison.     Shoulder Injection Procedure Note    Right shoulder subacromial bursa injection was discussed with the patient in detail, including indication, risks, benefits, and alternatives. Verbal consent was given for the procedure. Injection was performed by MD.  Injection site was identified by physical examination and cleaned with Betadine and alcohol swabs. Prior to needle insertion, ethyl chloride spray was used for surface anesthesia. Sterile technique was used.  A 25-gauge, 1.5\" needle was directed to the joint from a posterior approach. Injectate was passed into the subacromial bursa without difficulty. The needle was removed and a simple bandage was applied. The procedure was tolerated well without difficulty.    Injection mixture:  1% lidocaine without epinephrine: 2 mL  40 mg/mL triamcinolone acetonide: 1 mL     Diagnoses and all orders for this visit:    Chronic pain of both shoulders  -     XR Shoulder 2+ View Bilateral  -     XR Clavicle Left  -     Ambulatory Referral to Physical Therapy " Evaluate and treat    Impingement syndrome of right shoulder  -     Ambulatory Referral to Physical Therapy Evaluate and treat  -     triamcinolone acetonide (KENALOG-40) injection 40 mg    Right shoulder pain appears to be very straightforward impingement, likely an underlying tendinitis of the supraspinatus as well.  Discussed options and agreed on subacromial injection and therapy.  Injection performed and tolerated well.  Discussed diabetes precautions.  Left shoulder pain is less clear, likely element of scapular stabilization, possibly some costochondral arthritis beneath the proximal clavicle as well.  E[facility]ation physical therapy with assistance with this.  Recheck in 1 month.

## 2022-05-06 ENCOUNTER — TELEPHONE (OUTPATIENT)
Dept: SPORTS MEDICINE | Facility: CLINIC | Age: 57
End: 2022-05-06

## 2022-05-06 ENCOUNTER — PATIENT ROUNDING (BHMG ONLY) (OUTPATIENT)
Dept: SPORTS MEDICINE | Facility: CLINIC | Age: 57
End: 2022-05-06

## 2022-05-06 NOTE — TELEPHONE ENCOUNTER
Patient called in wanting to know if you can send in alternative anti-swelling medication for him that will not upset his stomach. States Voltaren gel he was given did not work for his shoulder.    Thank you  Jeanne

## 2022-05-06 NOTE — PROGRESS NOTES
May 6, 2022    A [employer] Message has been sent to the patient for PATIENT ROUNDING with [facility]

## 2022-05-10 RX ORDER — CELECOXIB 200 MG/1
200 CAPSULE ORAL DAILY
Qty: 30 CAPSULE | Refills: 2 | Status: SHIPPED | OUTPATIENT
Start: 2022-05-10 | End: 2022-09-30

## 2022-05-27 DIAGNOSIS — M79.605 PAIN IN BOTH LOWER EXTREMITIES: ICD-10-CM

## 2022-05-27 DIAGNOSIS — M79.604 PAIN IN BOTH LOWER EXTREMITIES: ICD-10-CM

## 2022-05-27 DIAGNOSIS — G62.9 POLYNEUROPATHY: ICD-10-CM

## 2022-05-27 RX ORDER — PREGABALIN 225 MG/1
CAPSULE ORAL
Qty: 90 CAPSULE | Refills: 5 | Status: SHIPPED | OUTPATIENT
Start: 2022-05-27 | End: 2022-05-31 | Stop reason: SDUPTHER

## 2022-05-27 NOTE — TELEPHONE ENCOUNTER
Caller: Brady Mota    Relationship: Self    Best call back number: [phone]       Requested Prescriptions:   Requested Prescriptions     Pending Prescriptions Disp Refills   • pregabalin (LYRICA) 225 MG capsule 90 capsule 5     Sig: Take 1 capsule by mouth 3 (Three) Times a Day.        Pharmacy where request should be sent: [id number] [place] [address] AT [address] & [facility] - [phone] [facility] [phone] FX     Additional details provided by patient: ONLY HAS A COUPLE DAYS LEFT     Does the patient have less than a 3 day supply:  [x] Yes  [] No    APRIL Tara SNYDER Rep   05/27/22 10:13 EDT

## 2022-05-31 DIAGNOSIS — G62.9 POLYNEUROPATHY: ICD-10-CM

## 2022-05-31 DIAGNOSIS — M79.605 PAIN IN BOTH LOWER EXTREMITIES: ICD-10-CM

## 2022-05-31 DIAGNOSIS — M79.604 PAIN IN BOTH LOWER EXTREMITIES: ICD-10-CM

## 2022-05-31 RX ORDER — PREGABALIN 225 MG/1
CAPSULE ORAL
Qty: 90 CAPSULE | Refills: 5 | Status: SHIPPED | OUTPATIENT
Start: 2022-05-31 | End: 2022-09-30 | Stop reason: SDUPTHER

## 2022-05-31 NOTE — TELEPHONE ENCOUNTER
Caller: Brady Mota    Relationship: Self    Best call back number: [phone] (H)    Requested Prescriptions:   Requested Prescriptions     Pending Prescriptions Disp Refills   • pregabalin (LYRICA) 225 MG capsule 90 capsule 5     Sig: Take on capsule by mouth three times daily        Pharmacy where request should be sent: [facility] [address] AT [address] & [address]  [phone] [facility] [phone] FX     Additional details provided by patient: PATIENT ACCIDENTALLY REQUESTED THIS BE SENT TO THE WRONG PHARMACY, NEEDS TO BE SENT TO [facility]    Does the patient have less than a 3 day supply:  [x] Yes  [] No    Tara Waldrop Rep   05/31/22 14:25 EDT

## 2022-06-06 ENCOUNTER — HOSPITAL ENCOUNTER (OUTPATIENT)
Dept: PHYSICAL THERAPY | Facility: HOSPITAL | Age: 57
Setting detail: THERAPIES SERIES
Discharge: HOME | End: 2022-06-06

## 2022-06-06 DIAGNOSIS — M62.81 MUSCLE WEAKNESS OF UPPER EXTREMITY: ICD-10-CM

## 2022-06-06 DIAGNOSIS — M25.512 BILATERAL SHOULDER PAIN, UNSPECIFIED CHRONICITY: Primary | ICD-10-CM

## 2022-06-06 DIAGNOSIS — M25.611 DECREASED RIGHT SHOULDER RANGE OF MOTION: ICD-10-CM

## 2022-06-06 DIAGNOSIS — M25.511 BILATERAL SHOULDER PAIN, UNSPECIFIED CHRONICITY: Primary | ICD-10-CM

## 2022-06-06 DIAGNOSIS — M75.41 IMPINGEMENT SYNDROME OF RIGHT SHOULDER: ICD-10-CM

## 2022-06-06 PROCEDURE — 97161 PT EVAL LOW COMPLEX 20 MIN: CPT

## 2022-06-06 PROCEDURE — 97110 THERAPEUTIC EXERCISES: CPT

## 2022-06-06 NOTE — THERAPY EVALUATION
Outpatient Physical Therapy Ortho Initial Evaluation  [facility]     Patient Name: Brady Mota  : 1965  MRN: [id number]  Today's Date: 2022      Visit Date: 2022    Patient Active Problem List   Diagnosis   • Acute upper respiratory infection   • Chest pain   • Sensation of plugged ear   • Impotence of organic origin   • Gastroesophageal reflux disease   • Hyperlipidemia   • Impaired fasting glucose   • Pain of lower extremity   • Muscle pain   • Polyneuropathy   • Seasonal allergic rhinitis   • Sinusitis   • Controlled type 2 diabetes mellitus with diabetic polyneuropathy, without long-term current use of insulin (HCC)   • Mass of right side of neck   • Memory change   • PASTOR (obstructive sleep apnea)   • Generalized OA   • IBS (irritable bowel syndrome)   • NAFLD (nonalcoholic fatty liver disease)   • Rectal bleeding        Past Medical History:   Diagnosis Date   • Allergic    • Cholelithiasis     Gallbladder removed   • Colon polyp     Small, removed during colonoscopy   • Diabetes mellitus (HCC)    • Environmental allergies    • GERD (gastroesophageal reflux disease)     Avoid Reflux controlled with medication   • Headache     Occasional string sinus headaches   • Hernia     Umbilical repair performed   • HL (hearing loss)     Only if background noise is present   • Hyperlipidemia    • Liver disease     Fatty liver   • Low back pain     Lower back   • Obesity    • Peptic ulceration     Small notes during upper GI around 20 years ago   • Peripheral neuropathy    • Pre-diabetes    • Scoliosis     I think so   • Shoulder injury     Current issue   • Visual impairment     Need readers        Past Surgical History:   Procedure Laterality Date   • ADENOIDECTOMY     • CHOLECYSTECTOMY     • COLONOSCOPY     • EYE SURGERY      Lasik   • LASIK     • SALIVARY GLAND SURGERY     • TONSILLECTOMY     • TONSILLECTOMY AND ADENOIDECTOMY, UVULOPALATOPHARYNGOPLASTY     • UMBILICAL HERNIA REPAIR          Visit Dx:     ICD-10-CM ICD-9-CM   1. Bilateral shoulder pain, unspecified chronicity  M25.511 719.41    M25.512    2. Impingement syndrome of right shoulder  M75.41 726.2   3. Decreased right shoulder range of motion  M25.611 719.51   4. Muscle weakness of upper extremity  M62.81 728.87          Patient History     Row Name 06/06/22 1100             History    Chief Complaint Pain  -JA      Type of Pain Shoulder pain  -JA      Date Current Problem(s) Began --  L started a couple years ago; R around Nov 2021  -JA      Brief Description of Current Complaint Pain mainly when trying to get to sleep now.  Before starting the Celebrex it was more painful; states Celebrex is what has helped him more.   Pain used to stay longer, when it is provoked the pain doesn't stay as long now.  Noticed that he had pain with driving before the pandemic.  Has bad peripheral neuropathy - 11-12 years and takes Rx for that. Doesn't drive much now that he works from home. Takes flexeril prn. Has an ergonomic mouse and good setup at home.  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 due to peripheral neuropathy  -JA              Pain     Pain Location Shoulder  -JA      Pain at Present 1;0  -JA      Pain at Worst 1;2  -JA              Fall Risk Assessment    Any falls in the past year: No  -JA              Services    Prior Rehab/Home Health Experiences No  -JA              Daily Activities    Primary Language English  -ED      Barriers to learning None  -ED      Recommended Referrals Physical Therapy  -ED      Pt Participated in POC and Goals Yes  -ED            User Key  (r) = Recorded By, (t) = Taken By, (c) = Cosigned By    Initials Name Provider Type    Ora Mendoza, PT Physical Therapist                 PT Ortho     Row Name 06/06/22 1100       Posture/Observations    Alignment Options Forward head;Cervical lordosis;Thoracic kyphosis;Rounded  shoulders;Scapular elevation;Scapular winging  -    Forward Head Increased;Mild  -JA    Cervical Lordosis Decreased  -JA    Thoracic Kyphosis Increased  -JA    Rounded Shoulders Right:;Moderate  -JA    Posture/Observations Comments in supine R shoulder rests further anteriorly than L, scap protracted  -       Special Tests/Palpation    Special Tests/Palpation Shoulder  -JA       Shoulder Impingement/Rotator Cuff Special Tests    Galindo-Arash Test (RC Lesion vs. Bursitis) Right:;Positive  -    Neer Impingement Test (RC Lesion vs. Bursitis) Right:;Positive  -    Empty Can Test (RC Lesion) Right:;Positive  -       Shoulder Girdle Palpation    Shoulder Girdle Palpation? No Abnormality/Tenderness  c/o pain consistent with RC impingement, minimal  point tenderness  -       General ROM    RT Upper Ext Rt Shoulder Flexion;Rt Shoulder ABduction;Rt Shoulder External Rotation;Rt Shoulder Internal Rotation  -JA    LT Upper Ext Lt Shoulder ABduction;Lt Shoulder Flexion;Lt Shoulder External Rotation;Lt Shoulder Internal Rotation  -JA       Right Upper Ext    Rt Shoulder Abduction AROM 175  -JA    Rt Shoulder Abduction PROM WNL  -JA    Rt Shoulder Flexion AROM 165  -JA    Rt Shoulder Flexion PROM WNL  -JA    Rt Shoulder External Rotation AROM GUILLE reaches back of head, discomfort when increasing ER  -JA    Rt Shoulder External Rotation PROM 75  -JA    Rt Shoulder Internal Rotation AROM FIR L3  -JA    Rt Shoulder Internal Rotation PROM 50  -JA    Rt Upper Extremity Comments  pain at end ranges of PROM  -JA       Left Upper Ext    Lt Shoulder Abduction AROM WNL  -JA    Lt Shoulder Flexion AROM WNL  -JA    Lt Shoulder External Rotation AROM WNL  -JA    Lt Shoulder Internal Rotation AROM WNL  -JA       MMT (Manual Muscle Testing)    Rt Upper Ext Rt Shoulder Flexion;Rt Shoulder ABduction;Rt Shoulder Internal Rotation;Rt Shoulder External Rotation  -JA    Lt Upper Ext Lt Shoulder Flexion;Lt Shoulder ABduction;Lt Shoulder  Internal Rotation;Lt Shoulder External Rotation  -JA       MMT Right Upper Ext    Rt Shoulder Flexion MMT, Gross Movement (4/5) good  -JA    Rt Shoulder ABduction MMT, Gross Movement (4+/5) good plus  -JA    Rt Shoulder Internal Rotation MMT, Gross Movement (4/5) good  -JA    Rt Shoulder External Rotation MMT, Gross Movement (4/5) good  -JA    Rt Upper Extremity Comments  mild discomfort only today  -JA       MMT Left Upper Ext    Lt Shoulder Flexion MMT, Gross Movement (4/5) good  -JA    Lt Shoulder ABduction MMT, Gross Movement (4/5) good  -JA    Lt Shoulder Internal Rotation MMT, Gross Movement (4/5) good  -JA    Lt Shoulder External Rotation MMT, Gross Movement (4/5) good  -JA    Lt Upper Extremity Comments  no pain  -JA          User Key  (r) = Recorded By, (t) = Taken By, (c) = Cosigned By    Initials Name Provider Type    Ora Mendoza, PT Physical Therapist                            Therapy Education  Education Details: [id number] Educated for importance of good posture and ergonomic setup up; initial HEP given; encouraged rev rolls and scap ret freq through the day.  Discussed POC to start with scap stabilization today and add RC strengthening next visit.  Given: HEP, Symptoms/condition management, Pain management, Posture/body mechanics  Program: New  How Provided: Verbal, Demonstration, Written  Provided to: Patient  Level of Understanding: Verbalized, Demonstrated      PT OP Goals     Row Name 06/06/22 1400          PT Short Term Goals    STG Date to Achieve 07/06/22  -ED     STG 1 Pt will be independent with initial HEP.  -ED     STG 1 Progress New  -ED     STG 2 Pt will report 20% decrease in pain at night.  -ED     STG 2 Progress New  -ED     STG 3 Pt will demonstrate good posture in sitting/standing to reduce positional strain on shoulder.  -ED     STG 3 Progress New  -ED            Long Term Goals    LTG Date to Achieve 08/05/22  -ED     LTG 1 Pt will be independent with RC strengthening and  scapular stabilization.  -     LTG 1 Progress New  -JA     LTG 2 Pt will have negative impingement sign for R shoulder.  -JA     LTG 2 Progress New  -JA     LTG 3 Pt will demonstrate end range GUILLE and FIR without increased pain.  -JA     LTG 3 Progress New  -JA     LTG 4 Pt will report 50% decrease in shoulder pain when lying down to sleep at night.  -ED     LTG 4 Progress New  -JA            Time Calculation    PT Goal Re-Cert Due Date 09/04/22  -           User Key  (r) = Recorded By, (t) = Taken By, (c) = Cosigned By    Initials Name Provider Type    Ora Mendoza, PT Physical Therapist                 PT Assessment/Plan     Row Name 06/06/22 1200          PT Assessment    Functional Limitations Limitation in home management;Limitations in community activities;Limitations in functional capacity and performance;Performance in leisure activities;Performance in self-care ADL  -     Impairments Pain;Posture;Range of motion;Poor body mechanics;Muscle strength  -     Assessment Comments Brady Mota is a 56 y.o. male referred to outpatient physical therapy for evaluation and treatment of bilateral shoulder pain and R shoulder impingement syndrome. He notes that since beginning Celebrex his pain has decreased and his primary pain now is at night when lying down to sleep. Patient presents with poor posture, decreased R shoulder ROM, L shoulder ROM WNL, weakness in dez shoulders, positive impingement signs R shoulder. Signs and symptoms are consistent with referring diagnosis.  Pertinent comorbidities and personal factors that may affect progress include, but are not limited to, poor postural habits and sedentary lifestyle.  This condition is stable. Recommend skilled PT to address functional deficits. Thank you for this referral.  -ED     Please refer to paper survey for additional self-reported information Yes  -     Rehab Potential Good  -     Patient/caregiver participated in establishment of  treatment plan and goals Yes  -     Patient would benefit from skilled therapy intervention Yes  -JA            PT Plan    PT Frequency 1x/week  -     Predicted Duration of Therapy Intervention (PT) 6-10 visits  -     Planned CPT's? PT EVAL LOW COMPLEXITY: 68551;PT RE-EVAL: 59320;PT THER PROC EA 15 MIN: 49551;PT THER ACT EA 15 MIN: 72277;PT MANUAL THERAPY EA 15 MIN: 20508;PT NEUROMUSC RE-EDUCATION EA 15 MIN: 97128;PT SELF CARE/HOME MGMT/TRAIN EA 15: 28309;PT HOT OR COLD PACK TREAT MCARE  -     PT Plan Comments review initial HEP, reassess ROM, add RC strengthening, HzAbd in supine, resisted ROW, pec doorway stretch; add manual GHJ mobs, stretching and PROM as needed  -           User Key  (r) = Recorded By, (t) = Taken By, (c) = Cosigned By    Initials Name Provider Type    Ora Mendoza, PT Physical Therapist                   OP Exercises     Row Name 06/06/22 1200             Subjective Comments    Subjective Comments initial eval  -              Total Minutes    44612 - PT Therapeutic Exercise Minutes 15  -JA              Exercise 1    Exercise Name 1 shoulder ER stretch in doorway  -      Cueing 1 Verbal;Tactile;Demo  -JA      Reps 1 3  -JA      Time 1 20sec  -JA      Additional Comments copious cuing to keep elbow stationary at his side  -              Exercise 2    Exercise Name 2 standing FIR stretch with assist of opposite hand  -      Cueing 2 Verbal;Tactile;Demo  -JA      Reps 2 3  -JA      Time 2 20sec  -JA              Exercise 3    Exercise Name 3 reverse shoulder rolls  -      Cueing 3 Verbal;Tactile;Demo  -JA      Reps 3 10  -JA      Additional Comments try to perform throughout the day  -              Exercise 4    Exercise Name 4 scapular retraction with elbows bend and then with them straight  -      Cueing 4 Verbal;Tactile;Demo  -JA      Sets 4 2  -JA      Reps 4 10  -JA      Time 4 3sec  -JA      Additional Comments try to perform throughout the day  -             User Key  (r) = Recorded By, (t) = Taken By, (c) = Cosigned By    Initials Name Provider Type    Ora Mendoza, PT Physical Therapist                              Outcome Measure Options: Quick DASH  Quick DASH  Open a tight or new jar.: No Difficulty  Do heavy household chores (e.g., wash walls, wash floors): No Difficulty  Carry a shopping bag or briefcase: Mild Difficulty  Wash your back: Mild Difficulty  Use a knife to cut food: No Difficulty  Recreational activities in which you take some force or impact through your arm, should or hand (e.g. golf, hammering, tennis, etc.): No Difficulty  During the past week, to what extent has your arm, shoulder, or hand problem interfered with your normal social activites with family, friends, neighbors or groups?: Not at all  During the past week, were you limited in your work or other regular daily activities as a result of your arm, shoulder or hand problem?: Not limited at all  Arm, Shoulder, or hand pain: Mild  Tingling (pins and needles) in your arm, shoulder, or hand: Moderate  During the past week, how much difficulty have you had sleeping because of the pain in your arm, shoulder or hand?: Mild Difficulty  Quick Dash Comments: has been less painful since starting Celebrex  Number of Questions Answered: 11  Quick DASH Score: 13.64         Time Calculation:     Timed Charges  31930 - PT Therapeutic Exercise Minutes: 15  Untimed Charges  PT Eval/Re-eval Minutes: 30  Total Minutes  Timed Charges Total Minutes: 15  Untimed Charges Total Minutes: 30   Total Minutes: 45     Therapy Charges for Today     Code Description Service Date Service Provider Modifiers Qty    64109646644 HC PT THER PROC EA 15 MIN 6/6/2022 Ora Alex, PT GP 1    45747732988 HC PT EVAL LOW COMPLEXITY 2 6/6/2022 Ora Alex, PT GP 1          PT G-Codes  Outcome Measure Options: Quick DASH  Quick DASH Score: 13.64         Ora Alex PT  6/6/2022

## 2022-06-17 ENCOUNTER — APPOINTMENT (OUTPATIENT)
Dept: PHYSICAL THERAPY | Facility: HOSPITAL | Age: 57
End: 2022-06-17

## 2022-06-22 ENCOUNTER — HOSPITAL ENCOUNTER (OUTPATIENT)
Dept: PHYSICAL THERAPY | Facility: HOSPITAL | Age: 57
Setting detail: THERAPIES SERIES
Discharge: HOME | End: 2022-06-22

## 2022-06-22 DIAGNOSIS — M25.611 DECREASED RIGHT SHOULDER RANGE OF MOTION: ICD-10-CM

## 2022-06-22 DIAGNOSIS — M62.81 MUSCLE WEAKNESS OF UPPER EXTREMITY: ICD-10-CM

## 2022-06-22 DIAGNOSIS — M25.511 BILATERAL SHOULDER PAIN, UNSPECIFIED CHRONICITY: Primary | ICD-10-CM

## 2022-06-22 DIAGNOSIS — M75.41 IMPINGEMENT SYNDROME OF RIGHT SHOULDER: ICD-10-CM

## 2022-06-22 DIAGNOSIS — M25.512 BILATERAL SHOULDER PAIN, UNSPECIFIED CHRONICITY: Primary | ICD-10-CM

## 2022-06-22 PROCEDURE — 97110 THERAPEUTIC EXERCISES: CPT

## 2022-06-22 NOTE — THERAPY TREATMENT NOTE
Outpatient Physical Therapy Ortho Treatment Note  [facility]     Patient Name: Brady Mota  : 1965  MRN: [id number]  Today's Date: 2022      Visit Date: 2022    Visit Dx:    ICD-10-CM ICD-9-CM   1. Bilateral shoulder pain, unspecified chronicity  M25.511 719.41    M25.512    2. Impingement syndrome of right shoulder  M75.41 726.2   3. Decreased right shoulder range of motion  M25.611 719.51   4. Muscle weakness of upper extremity  M62.81 728.87       Patient Active Problem List   Diagnosis   • Acute upper respiratory infection   • Chest pain   • Sensation of plugged ear   • Impotence of organic origin   • Gastroesophageal reflux disease   • Hyperlipidemia   • Impaired fasting glucose   • Pain of lower extremity   • Muscle pain   • Polyneuropathy   • Seasonal allergic rhinitis   • Sinusitis   • Controlled type 2 diabetes mellitus with diabetic polyneuropathy, without long-term current use of insulin (HCC)   • Mass of right side of neck   • Memory change   • PASTOR (obstructive sleep apnea)   • Generalized OA   • IBS (irritable bowel syndrome)   • NAFLD (nonalcoholic fatty liver disease)   • Rectal bleeding        Past Medical History:   Diagnosis Date   • Allergic    • Cholelithiasis     Gallbladder removed   • Colon polyp     Small, removed during colonoscopy   • Diabetes mellitus (HCC)    • Environmental allergies    • GERD (gastroesophageal reflux disease)     Avoid Reflux controlled with medication   • Headache     Occasional string sinus headaches   • Hernia     Umbilical repair performed   • HL (hearing loss)     Only if background noise is present   • Hyperlipidemia    • Liver disease     Fatty liver   • Low back pain     Lower back   • Obesity    • Peptic ulceration     Small notes during upper GI around 20 years ago   • Peripheral neuropathy    • Pre-diabetes    • Scoliosis     I think so   • Shoulder injury     Current issue   • Visual impairment     Need readers        Past  Surgical History:   Procedure Laterality Date   • ADENOIDECTOMY     • CHOLECYSTECTOMY     • COLONOSCOPY  2012   • EYE SURGERY      Lasik   • LASIK     • SALIVARY GLAND SURGERY     • TONSILLECTOMY     • TONSILLECTOMY AND ADENOIDECTOMY, UVULOPALATOPHARYNGOPLASTY     • UMBILICAL HERNIA REPAIR          PT Ortho     Row Name 06/22/22 1200       Posture/Observations    Posture/Observations Comments observed improved sitting and standing posture  -ED       Shoulder Impingement/Rotator Cuff Special Tests    Neer Impingement Test (RC Lesion vs. Bursitis) Right:  tightness more than pain  -ED    Empty Can Test (RC Lesion) Right:  mildly positive  -ED       Right Upper Ext    Rt Shoulder Abduction AROM WNL  -JA    Rt Shoulder Flexion AROM 170  -JA    Rt Shoulder External Rotation PROM 80  -JA    Rt Shoulder Internal Rotation AROM FIR T11  -JA    Rt Shoulder Internal Rotation PROM 55  -JA    Rt Upper Extremity Comments  GUILLE mildly painful at endrange-the only movement that is painful today  -ED          User Key  (r) = Recorded By, (t) = Taken By, (c) = Cosigned By    Initials Name Provider Type    Ora Mendoza, PT Physical Therapist                             PT Assessment/Plan     Row Name 06/22/22 1700          PT Assessment    Assessment Comments Brady returns for first f/u visit and reports good response to initial HEP after initial soreness from eval.  He demosntrates increasing AROM with less impingement pain and decreased pain at night.  He has met 3/3 STGs and is progressing toward LTGs.  We were able to progress HEP with scap stabilization and RC strengthening with good teachback.  We discussed that he may not require all the appts made if he continues to improve however he is still taking Celebrex and I would like to see him taper off in the next couple of weeks and will monitor for shoulder impingement.  He will benefit from continuing skilled therapy services.  -ED            PT Plan    PT Plan Comments  consider adding gh jt mobs, cont with resisted strengthing; add pec stretch and recommend as daily since he works at [employer] long hours.  -ED           User Key  (r) = Recorded By, (t) = Taken By, (c) = Cosigned By    Initials Name Provider Type    Ora Mendoza, PT Physical Therapist                   OP Exercises     Row Name 06/22/22 1100             Subjective Comments    Subjective Comments I'm not having much pain anymore and I can move my arm more.  I have been working on my posture a lot.  -JA              Total Minutes    67818 - PT Therapeutic Exercise Minutes 38  -JA              Exercise 1    Exercise Name 1 shoulder ER stretch in doorway  -JA      Cueing 1 Verbal;Tactile;Demo  -JA      Reps 1 3  -JA      Time 1 20sec  -JA              Exercise 2    Exercise Name 2 standing FIR stretch with assist of opposite hand  -JA      Cueing 2 Verbal;Tactile;Demo  -JA      Reps 2 3  -JA      Time 2 20sec  -JA              Exercise 3    Exercise Name 3 reverse shoulder rolls  -JA      Cueing 3 Verbal;Tactile;Demo  -JA      Reps 3 10  -JA              Exercise 4    Exercise Name 4 scapular retraction with elbows bend and then with them straight  -JA      Cueing 4 Verbal;Tactile;Demo  -JA      Sets 4 2  -JA      Reps 4 10  -JA      Time 4 3sec  -JA            User Key  (r) = Recorded By, (t) = Taken By, (c) = Cosigned By    Initials Name Provider Type    Ora Mendoza, PT Physical Therapist                              PT OP Goals     Row Name 06/22/22 1200          PT Short Term Goals    STG Date to Achieve 07/06/22  -ED     STG 1 Pt will be independent with initial HEP.  -ED     STG 1 Progress Met  -JA     STG 2 Pt will report 20% decrease in pain at night.  -ED     STG 2 Progress Met  -ED     STG 3 Pt will demonstrate good posture in sitting/standing to reduce positional strain on shoulder.  -ED     STG 3 Progress Met  -ED            Long Term Goals    LTG Date to Achieve 08/05/22  -ED     LTG 1  Pt will be independent with RC strengthening and scapular stabilization.  -ED     LTG 1 Progress Ongoing;Progressing  -ED     LTG 2 Pt will have negative impingement sign for R shoulder.  -ED     LTG 2 Progress Progressing  -ED     LTG 3 Pt will demonstrate end range GUILLE and FIR without increased pain.  -ED     LTG 3 Progress Ongoing;Progressing  -ED     LTG 4 Pt will report 50% decrease in shoulder pain when lying down to sleep at night.  -ED     LTG 4 Progress Progressing  -ED           User Key  (r) = Recorded By, (t) = Taken By, (c) = Cosigned By    Initials Name Provider Type    Ora Mendoza, PT Physical Therapist                Therapy Education  Education Details: updated and printed HEP, issued yellow tband and red for him to progress to. Discussed tapering off celebrex and monitoring symptoms.  Given: HEP, Symptoms/condition management, Pain management, Posture/body mechanics  Program: Reinforced, Progressed  How Provided: Verbal, Demonstration, Written  Provided to: Patient  Level of Understanding: Verbalized, Demonstrated, Teach back education performed              Time Calculation:   Start Time: 1151  Stop Time: 1230  Time Calculation (min): 39 min  Timed Charges  83749 - PT Therapeutic Exercise Minutes: 38  Total Minutes  Timed Charges Total Minutes: 38   Total Minutes: 38  Therapy Charges for Today     Code Description Service Date Service Provider Modifiers Qty    97105065272 HC PT THER PROC EA 15 MIN 6/22/2022 Ora Alex PT GP 3                    Ora Alex PT  6/22/2022

## 2022-06-30 ENCOUNTER — TELEPHONE (OUTPATIENT)
Dept: PHYSICAL THERAPY | Facility: HOSPITAL | Age: 57
End: 2022-06-30

## 2022-06-30 NOTE — TELEPHONE ENCOUNTER
Spoke with patient regarding no show for today's appointment. Reminded pt of next appointment date and time, as well as reminded of 24 hour cancellation policy. Requested pt call back if unable to make next appointment.

## 2022-07-07 ENCOUNTER — HOSPITAL ENCOUNTER (OUTPATIENT)
Dept: PHYSICAL THERAPY | Facility: HOSPITAL | Age: 57
Setting detail: THERAPIES SERIES
Discharge: HOME | End: 2022-07-07

## 2022-07-07 DIAGNOSIS — M75.41 IMPINGEMENT SYNDROME OF RIGHT SHOULDER: ICD-10-CM

## 2022-07-07 DIAGNOSIS — M25.511 BILATERAL SHOULDER PAIN, UNSPECIFIED CHRONICITY: Primary | ICD-10-CM

## 2022-07-07 DIAGNOSIS — M62.81 MUSCLE WEAKNESS OF UPPER EXTREMITY: ICD-10-CM

## 2022-07-07 DIAGNOSIS — M25.611 DECREASED RIGHT SHOULDER RANGE OF MOTION: ICD-10-CM

## 2022-07-07 DIAGNOSIS — M25.512 BILATERAL SHOULDER PAIN, UNSPECIFIED CHRONICITY: Primary | ICD-10-CM

## 2022-07-07 PROCEDURE — 97110 THERAPEUTIC EXERCISES: CPT | Performed by: PHYSICAL THERAPIST

## 2022-07-07 NOTE — THERAPY DISCHARGE NOTE
Outpatient Physical Therapy Ortho Treatment Note/Discharge Summary  [facility]     Patient Name: Brady Mota  : 1965  MRN: [id number]  Today's Date: 2022      Visit Date: 2022    Visit Dx:    ICD-10-CM ICD-9-CM   1. Bilateral shoulder pain, unspecified chronicity  M25.511 719.41    M25.512    2. Impingement syndrome of right shoulder  M75.41 726.2   3. Decreased right shoulder range of motion  M25.611 719.51   4. Muscle weakness of upper extremity  M62.81 728.87       Patient Active Problem List   Diagnosis   • Acute upper respiratory infection   • Chest pain   • Sensation of plugged ear   • Impotence of organic origin   • Gastroesophageal reflux disease   • Hyperlipidemia   • Impaired fasting glucose   • Pain of lower extremity   • Muscle pain   • Polyneuropathy   • Seasonal allergic rhinitis   • Sinusitis   • Controlled type 2 diabetes mellitus with diabetic polyneuropathy, without long-term current use of insulin (HCC)   • Mass of right side of neck   • Memory change   • PASTOR (obstructive sleep apnea)   • Generalized OA   • IBS (irritable bowel syndrome)   • NAFLD (nonalcoholic fatty liver disease)   • Rectal bleeding        Past Medical History:   Diagnosis Date   • Allergic    • Cholelithiasis     Gallbladder removed   • Colon polyp 2012    Small, removed during colonoscopy   • Diabetes mellitus (HCC)    • Environmental allergies    • GERD (gastroesophageal reflux disease)     Avoid Reflux controlled with medication   • Headache     Occasional string sinus headaches   • Hernia     Umbilical repair performed   • HL (hearing loss)     Only if background noise is present   • Hyperlipidemia    • Liver disease     Fatty liver   • Low back pain     Lower back   • Obesity    • Peptic ulceration     Small notes during upper GI around 20 years ago   • Peripheral neuropathy    • Pre-diabetes    • Scoliosis     I think so   • Shoulder injury     Current issue   • Visual impairment     Need  readers        Past Surgical History:   Procedure Laterality Date   • ADENOIDECTOMY     • CHOLECYSTECTOMY     • COLONOSCOPY  2012   • EYE SURGERY      Lasik   • LASIK     • SALIVARY GLAND SURGERY     • TONSILLECTOMY     • TONSILLECTOMY AND ADENOIDECTOMY, UVULOPALATOPHARYNGOPLASTY     • UMBILICAL HERNIA REPAIR                          PT Assessment/Plan     Row Name 07/07/22 1157          PT Assessment    Assessment Comments Mr. Mota has attended 3 sessions of physical therapy from 6/6/2022-7/7/2022 for his bilateral shoulder pain. He is independent with his HEP, verbalizes a good understanding of his condition, demonstrates full bilateral AROM GUILLE/FIR without pain.  He has met all of his functional goals and reports that he is ready to attempt self managment of his condition. Encouraged pt to call with questions. Encouraged pt to continue ROM/stretching activities 1-2 x's per day, and strengthening 2-3 x's per week.  Mr. Mota is discharged from outpatient physical therapy to an independent program.  -GJ            PT Plan    PT Plan Comments DC to independent program.  -GJ           User Key  (r) = Recorded By, (t) = Taken By, (c) = Cosigned By    Initials Name Provider Type    Luis Gonzales, PT Physical Therapist                     OP Exercises     Row Name 07/07/22 1132 07/07/22 1100          Subjective Comments    Subjective Comments -- I'm doing well, haven't had any pain for about a week or two.  -GJ            Total Minutes    48212 - PT Therapeutic Exercise Minutes 20  -GJ --            Exercise 4    Exercise Name 4 -- scapular retraction with elbows bend and then with them straight  -GJ     Cueing 4 -- Verbal;Tactile;Demo  -GJ     Sets 4 -- 2  -GJ     Reps 4 -- 10  -GJ     Time 4 -- RTB  -GJ            Exercise 5    Exercise Name 5 -- UBE  -GJ     Time 5 -- 4 min  -GJ            Exercise 6    Exercise Name 6 -- doorway stretch  -GJ     Cueing 6 -- Verbal;Demo  -GJ     Reps 6 -- 3  -GJ     Time 6 --  20 s  -GJ            Exercise 7    Exercise Name 7 -- wash the wall  -GJ     Cueing 7 -- Verbal;Demo  -GJ     Reps 7 -- 15  -GJ     Time 7 -- 5s  -GJ            Exercise 8    Exercise Name 8 -- standiign IR, B, towel under humerus  -GJ     Cueing 8 -- Verbal;Demo  -GJ     Reps 8 -- 15  -GJ     Time 8 -- RTB  -GJ            Exercise 9    Exercise Name 9 -- standing B ER  -GJ     Cueing 9 -- Verbal;Demo  -GJ     Reps 9 -- 15  -GJ     Time 9 -- RTB  -GJ           User Key  (r) = Recorded By, (t) = Taken By, (c) = Cosigned By    Initials Name Provider Type    Luis Gonzales, PT Physical Therapist                                PT OP Goals     Row Name 07/07/22 1100          PT Short Term Goals    STG Date to Achieve 07/06/22  -GJ     STG 1 Pt will be independent with initial HEP.  -GJ     STG 1 Progress Met  -GJ     STG 2 Pt will report 20% decrease in pain at night.  -GJ     STG 2 Progress Met  -GJ     STG 3 Pt will demonstrate good posture in sitting/standing to reduce positional strain on shoulder.  -GJ     STG 3 Progress Met  -GJ            Long Term Goals    LTG Date to Achieve 08/05/22  -GJ     LTG 1 Pt will be independent with RC strengthening and scapular stabilization.  -GJ     LTG 1 Progress Met  -GJ     LTG 2 Pt will have negative impingement sign for R shoulder.  -GJ     LTG 2 Progress Met  -GJ     LTG 3 Pt will demonstrate end range GUILLE and FIR without increased pain.  -GJ     LTG 3 Progress Met  -GJ     LTG 4 Pt will report 50% decrease in shoulder pain when lying down to sleep at night.  -GJ     LTG 4 Progress Met  -GJ           User Key  (r) = Recorded By, (t) = Taken By, (c) = Cosigned By    Initials Name Provider Type    Luis Gonzales, PT Physical Therapist                Therapy Education  Education Details: answered questions, discussed independent management, ROM/stretches to be daily, strengthening to be 2-3 x's per day. call with questions  Given: HEP, Symptoms/condition management, Pain  management, Posture/body mechanics  Program: New, Reinforced  How Provided: Verbal, Demonstration, Written  Provided to: Patient  Level of Understanding: Teach back education performed, Verbalized, Demonstrated              Time Calculation:   Start Time: 1132  Stop Time: 1152  Time Calculation (min): 20 min  Timed Charges  55625 - PT Therapeutic Exercise Minutes: 20  Total Minutes  Timed Charges Total Minutes: 20   Total Minutes: 20  Therapy Charges for Today     Code Description Service Date Service Provider Modifiers Qty    95008045856 HC PT THER PROC EA 15 MIN 7/7/2022 Luis Jain, PT GP 1                OP PT Discharge Summary  Date of Discharge: 07/07/22  Reason for Discharge: All goals achieved, Independent, Patient/Caregiver request  Outcomes Achieved: Able to achieve all goals within established timeline  Discharge Destination: Home with home program  Discharge Instructions/Additional Comments: continue HEP, call with questions.      Luis Jain, PT  7/7/2022

## 2022-08-19 ENCOUNTER — TELEPHONE (OUTPATIENT)
Dept: INTERNAL MEDICINE | Facility: CLINIC | Age: 57
End: 2022-08-19

## 2022-08-19 NOTE — TELEPHONE ENCOUNTER
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 PATIENT STATED IT IS CAUSING MISTYPES AND IT TAKES A LONG TIME TO SEND MESSAGES AND WRITE EMAILS. PATIENT STATED THAT HE HAS SHARED THE OFFICE PHONE NUMBER AND FAX NUMBER WITH HR. PATIENT STATED THAT HE WANTED TO MAKE SURE IF THERE IS ANYTHING HE NEEDS TO DO TO ENSURE THAT INFORMATION CAN BE SHARED WITH HR WHEN THEY NEED IT. PLEASE ADVISE.     Do you require a callback: YES

## 2022-09-30 ENCOUNTER — OFFICE VISIT (OUTPATIENT)
Dept: INTERNAL MEDICINE | Facility: CLINIC | Age: 57
End: 2022-09-30

## 2022-09-30 VITALS
OXYGEN SATURATION: 94 % | TEMPERATURE: 97.8 F | DIASTOLIC BLOOD PRESSURE: 96 MMHG | HEART RATE: 91 BPM | BODY MASS INDEX: 31.64 KG/M2 | HEIGHT: 68 IN | WEIGHT: 208.8 LBS | SYSTOLIC BLOOD PRESSURE: 140 MMHG

## 2022-09-30 DIAGNOSIS — Z23 NEED FOR INFLUENZA VACCINATION: ICD-10-CM

## 2022-09-30 DIAGNOSIS — K76.0 NAFLD (NONALCOHOLIC FATTY LIVER DISEASE): ICD-10-CM

## 2022-09-30 DIAGNOSIS — M79.605 PAIN IN BOTH LOWER EXTREMITIES: ICD-10-CM

## 2022-09-30 DIAGNOSIS — K21.9 GASTROESOPHAGEAL REFLUX DISEASE WITHOUT ESOPHAGITIS: ICD-10-CM

## 2022-09-30 DIAGNOSIS — M79.604 PAIN IN BOTH LOWER EXTREMITIES: ICD-10-CM

## 2022-09-30 DIAGNOSIS — E78.2 MIXED HYPERLIPIDEMIA: Primary | ICD-10-CM

## 2022-09-30 DIAGNOSIS — E11.42 CONTROLLED TYPE 2 DIABETES MELLITUS WITH DIABETIC POLYNEUROPATHY, WITHOUT LONG-TERM CURRENT USE OF INSULIN: ICD-10-CM

## 2022-09-30 DIAGNOSIS — F32.A DEPRESSION, UNSPECIFIED DEPRESSION TYPE: ICD-10-CM

## 2022-09-30 DIAGNOSIS — G62.9 POLYNEUROPATHY: ICD-10-CM

## 2022-09-30 PROCEDURE — 90471 IMMUNIZATION ADMIN: CPT | Performed by: FAMILY MEDICINE

## 2022-09-30 PROCEDURE — 90686 IIV4 VACC NO PRSV 0.5 ML IM: CPT | Performed by: FAMILY MEDICINE

## 2022-09-30 PROCEDURE — 99214 OFFICE O/P EST MOD 30 MIN: CPT | Performed by: FAMILY MEDICINE

## 2022-09-30 RX ORDER — PREGABALIN 225 MG/1
CAPSULE ORAL
Qty: 90 CAPSULE | Refills: 5 | Status: SHIPPED | OUTPATIENT
Start: 2022-09-30 | End: 2023-03-29

## 2022-09-30 RX ORDER — ROSUVASTATIN CALCIUM 5 MG/1
5 TABLET, COATED ORAL DAILY
Qty: 90 TABLET | Refills: 3 | Status: SHIPPED | OUTPATIENT
Start: 2022-09-30

## 2022-09-30 RX ORDER — DULOXETIN HYDROCHLORIDE 60 MG/1
60 CAPSULE, DELAYED RELEASE ORAL DAILY
Qty: 90 CAPSULE | Refills: 3 | Status: SHIPPED | OUTPATIENT
Start: 2022-09-30

## 2022-10-01 LAB
ALBUMIN SERPL-MCNC: 5 G/DL (ref 3.5–5.2)
ALBUMIN/GLOB SERPL: 2.5 G/DL
ALP SERPL-CCNC: 81 U/L (ref 39–117)
ALT SERPL-CCNC: 32 U/L (ref 1–41)
AST SERPL-CCNC: 28 U/L (ref 1–40)
BASOPHILS # BLD AUTO: 0.04 10*3/MM3 (ref 0–0.2)
BASOPHILS NFR BLD AUTO: 0.6 % (ref 0–1.5)
BILIRUB SERPL-MCNC: 0.4 MG/DL (ref 0–1.2)
BUN SERPL-MCNC: 14 MG/DL (ref 6–20)
BUN/CREAT SERPL: 17.5 (ref 7–25)
CALCIUM SERPL-MCNC: 10.2 MG/DL (ref 8.6–10.5)
CHLORIDE SERPL-SCNC: 99 MMOL/L (ref 98–107)
CHOLEST SERPL-MCNC: 192 MG/DL (ref 0–200)
CHOLEST/HDLC SERPL: 5.33 {RATIO}
CO2 SERPL-SCNC: 28.9 MMOL/L (ref 22–29)
CREAT SERPL-MCNC: 0.8 MG/DL (ref 0.76–1.27)
EGFRCR SERPLBLD CKD-EPI 2021: 103.2 ML/MIN/1.73
EOSINOPHIL # BLD AUTO: 0.22 10*3/MM3 (ref 0–0.4)
EOSINOPHIL NFR BLD AUTO: 3.5 % (ref 0.3–6.2)
ERYTHROCYTE [DISTWIDTH] IN BLOOD BY AUTOMATED COUNT: 12.4 % (ref 12.3–15.4)
FOLATE SERPL-MCNC: >20 NG/ML (ref 4.78–24.2)
GLOBULIN SER CALC-MCNC: 2 GM/DL
GLUCOSE SERPL-MCNC: 121 MG/DL (ref 65–99)
HBA1C MFR BLD: 6.7 % (ref 4.8–5.6)
HCT VFR BLD AUTO: 48.8 % (ref 37.5–51)
HDLC SERPL-MCNC: 36 MG/DL (ref 40–60)
HGB BLD-MCNC: 16.6 G/DL (ref 13–17.7)
IMM GRANULOCYTES # BLD AUTO: 0.02 10*3/MM3 (ref 0–0.05)
IMM GRANULOCYTES NFR BLD AUTO: 0.3 % (ref 0–0.5)
LDLC SERPL CALC-MCNC: 79 MG/DL (ref 0–100)
LYMPHOCYTES # BLD AUTO: 1.71 10*3/MM3 (ref 0.7–3.1)
LYMPHOCYTES NFR BLD AUTO: 26.9 % (ref 19.6–45.3)
MCH RBC QN AUTO: 30.8 PG (ref 26.6–33)
MCHC RBC AUTO-ENTMCNC: 34 G/DL (ref 31.5–35.7)
MCV RBC AUTO: 90.5 FL (ref 79–97)
MONOCYTES # BLD AUTO: 0.56 10*3/MM3 (ref 0.1–0.9)
MONOCYTES NFR BLD AUTO: 8.8 % (ref 5–12)
NEUTROPHILS # BLD AUTO: 3.8 10*3/MM3 (ref 1.7–7)
NEUTROPHILS NFR BLD AUTO: 59.9 % (ref 42.7–76)
NRBC BLD AUTO-RTO: 0.2 /100 WBC (ref 0–0.2)
PLATELET # BLD AUTO: 177 10*3/MM3 (ref 140–450)
POTASSIUM SERPL-SCNC: 4.9 MMOL/L (ref 3.5–5.2)
PROT SERPL-MCNC: 7 G/DL (ref 6–8.5)
PSA SERPL-MCNC: 1.3 NG/ML (ref 0–4)
RBC # BLD AUTO: 5.39 10*6/MM3 (ref 4.14–5.8)
REFLEX: NORMAL
SODIUM SERPL-SCNC: 143 MMOL/L (ref 136–145)
T4 FREE SERPL-MCNC: 0.85 NG/DL (ref 0.93–1.7)
TRIGL SERPL-MCNC: 485 MG/DL (ref 0–150)
TSH SERPL DL<=0.005 MIU/L-ACNC: 0.68 UIU/ML (ref 0.27–4.2)
UNABLE TO VOID: NORMAL
VIT B12 SERPL-MCNC: 607 PG/ML (ref 211–946)
VLDLC SERPL CALC-MCNC: 77 MG/DL (ref 5–40)
WBC # BLD AUTO: 6.35 10*3/MM3 (ref 3.4–10.8)

## 2023-02-03 NOTE — TELEPHONE ENCOUNTER
Last OV 04/14/2022  Next OV 07/18/2022    SUBJECTIVE:    Will Pillai is a 76 y.o. female who presents for a follow up visit. Chief Complaint   Patient presents with    Follow-up     Patient is here for a follow up. No new concerns. Did not get lab. Wants to hold off on colonoscopy. Hyperlipidemia  This is a chronic problem. The current episode started more than 1 year ago. Exacerbating diseases include chronic renal disease and obesity. Factors aggravating her hyperlipidemia include thiazides. Pertinent negatives include no chest pain or shortness of breath. Current antihyperlipidemic treatment includes statins. Compliance problems include adherence to exercise and adherence to diet. Risk factors for coronary artery disease include dyslipidemia, hypertension, obesity, a sedentary lifestyle and post-menopausal.   Hypertension  This is a chronic problem. The current episode started more than 1 year ago. The problem is controlled. Associated symptoms include headaches. Pertinent negatives include no chest pain, palpitations or shortness of breath. Agents associated with hypertension include NSAIDs. Risk factors for coronary artery disease include dyslipidemia, obesity, post-menopausal state, sedentary lifestyle and smoking/tobacco exposure. Past treatments include angiotensin blockers and diuretics. The current treatment provides significant improvement. Compliance problems include exercise and diet. Hypertensive end-organ damage includes kidney disease and CAD/MI. Identifiable causes of hypertension include chronic renal disease. Coronary Artery Disease  Presents for follow-up visit. Symptoms include leg swelling. Pertinent negatives include no chest pain, chest tightness, dizziness, palpitations or shortness of breath. Risk factors include hyperlipidemia and obesity. Patient's medications, allergies, past medical,surgical, social and family histories were reviewed and updated as appropriate.      Past Medical History:   Diagnosis Date    CAD (coronary artery disease)     NONOBSTRUCTING    CKD (chronic kidney disease) stage 3, GFR 30-59 ml/min (HCC)     IBS (irritable bowel syndrome)     Lumbago     Malignant neoplasm of upper lobe of left lung ([facility] [address] 08/2022    LAKESHA adenoCa 1.7cm    Osteoarthrosis, unspecified whether generalized or localized, unspecified site     Other abnormal blood chemistry     PAD (peripheral artery disease) ([facility] [address]     has stents in lower legs bilaterally implanted by Dr. Erick Vu    Pure hypercholesterolemia     Risk for falls 02/02/2016    S/p Left TKR    Spinal stenosis     Tremor     Unspecified essential hypertension     Wears glasses      Past Surgical History:   Procedure Laterality Date    CAROTID ENDARTERECTOMY Left 1/11/2023    LEFT CAROTID ENDARTERECTOMY performed by Freddie Hidalgo MD at [address]      CT NEEDLE BIOPSY LUNG PERCUTANEOUS  06/17/2022    CT NEEDLE BIOPSY LUNG PERCUTANEOUS 6/17/2022 [facility] CT SCAN    INSERTABLE CARDIAC MONITOR  09/2015    ILR at [facility]- implanted 10/02/2015    KNEE SURGERY      RIGHT need replacement    LEG SURGERY      2 stents placed in right leg, 1 stent and balloon in left leg. LSFA stenting 03/02/2022    LOBECTOMY Left 08/17/2022    LAKESHA wedge/completion lobectomy, LN sampling, IC cryo nerve block    PACEMAKER INSERTION  05/27/2022    biV ([employer])    ROTATOR CUFF REPAIR Bilateral     SHOULDER ARTHROSCOPY Left 08/21/2020    LEFT SHOULDER ARTHROSCOPY, SUBACROMINAL DECOMPRESSION, DISTAL CLAVICLE EXCISION,DEBRIDEMENT, ROTATOR CUFF REPAIR, AUGMENTATION performed by Kelly Quispe DO at [address] ARTHROSCOPY Right     THORACOSCOPY Left 08/17/2022    LEFT VIDEO ASSISTED THORACOSCOPIC SURGERY. THORACOTOMY WITH WEDGE EXCISION OF LEFT UPPER LOBE LUNG NODULE. LOBECTOMY.  INTERCOSTAL CRYO BLOCK. performed by Helena Mg MD at [address] Left 01/12/2016    Dr Brigid Mike 2022    US BIOPSY OF SALIVARY GLAND 2022 [facility] ULTRASOUND     Family History   Problem Relation Age of Onset    Cancer Mother         cancer of [place]    Heart Disease Father          of MI age 71 - smoker,     Diabetes Sister     Hypertension Sister     Cancer Brother     Emphysema Brother     Stroke Brother     Diabetes Brother     Heart Disease Brother     Hypertension Brother     High Cholesterol Neg Hx     High Blood Pressure Neg Hx      Social History     Tobacco Use    Smoking status: Every Day     Packs/day: 0.25     Years: 40.00     Pack years: 10.00     Types: Cigarettes    Smokeless tobacco: Never    Tobacco comments:     <1 pack of cigarettes per day     Started at age 23   Substance Use Topics    Alcohol use: Yes     Comment: glass of wine once a month      Allergies   Allergen Reactions    Morphine Nausea And Vomiting     Causes mental impairment    Pletal [Cilostazol] Diarrhea and Headaches    Gabapentin      \"Loopy\", forgetful, arms heavy feeling.      Ace Inhibitors Other (See Comments)     Cough     Chantix [Varenicline] Other (See Comments)     Depression, mood swings, nightmares    Keflex [Cephalexin] Diarrhea    Niaspan [Niacin Er] Other (See Comments)     Skin irritation    Pravastatin Other (See Comments)     Pt unable to recall reaction     Current Outpatient Medications on File Prior to Visit   Medication Sig Dispense Refill    aspirin 81 MG EC tablet Take 81 mg by mouth daily      Cyanocobalamin (B-12) 1000 MCG SUBL Place under the tongue      Multiple Vitamins-Minerals (VITAMIN D3 COMPLETE PO) Take by mouth      atorvastatin (LIPITOR) 10 MG tablet TAKE 1 TABLET BY MOUTH DAILY 90 tablet 3    losartan-hydroCHLOROthiazide (HYZAAR) 100-25 MG per tablet TAKE 1 TABLET BY MOUTH DAILY 90 tablet 3    Multiple Vitamins-Minerals (THERAPEUTIC MULTIVITAMIN-MINERALS) tablet Take 1 tablet by mouth daily      celecoxib (CELEBREX) 200 MG capsule TAKE 1 CAPSULE BY MOUTH DAILY 30 capsule 3    dicyclomine (BENTYL) 10 MG capsule Take 1 capsule by mouth in the morning. 90 capsule 3    loratadine (CLARITIN) 10 MG tablet Take 1 tablet by mouth in the morning. (Patient taking differently: Take 10 mg by mouth daily as needed) 90 tablet 3     No current facility-administered medications on file prior to visit. Review of Systems   Constitutional:  Negative for activity change and fatigue. HENT:  Positive for congestion, postnasal drip, rhinorrhea and sinus pressure. Respiratory:  Negative for chest tightness and shortness of breath. Cardiovascular:  Positive for leg swelling. Negative for chest pain and palpitations. Gastrointestinal:  Positive for diarrhea. Negative for constipation. Genitourinary:  Negative for difficulty urinating. Musculoskeletal:  Positive for arthralgias (hips, knees, left thumb). Negative for back pain. Neurological:  Positive for headaches. Negative for dizziness and light-headedness. Psychiatric/Behavioral:  Negative for dysphoric mood and sleep disturbance. The patient is not nervous/anxious. OBJECTIVE:    /84   Temp 97.4 °F (36.3 °C)   Wt 167 lb (75.8 kg)   BMI 31.04 kg/m²    Physical Exam  Constitutional:       General: She is not in acute distress. Appearance: She is well-developed. She is obese. HENT:      Head: Normocephalic and atraumatic. Right Ear: Tympanic membrane and external ear normal.      Left Ear: Tympanic membrane and external ear normal.      Nose: Nose normal.      Mouth/Throat:      Mouth: Mucous membranes are moist.      Pharynx: No posterior oropharyngeal erythema. Eyes:      General:         Right eye: No discharge. Conjunctiva/sclera: Conjunctivae normal.   Neck:      Thyroid: No thyromegaly. Vascular: No JVD. Trachea: No tracheal deviation. Cardiovascular:      Rate and Rhythm: Normal rate and regular rhythm. Heart sounds: Normal heart sounds.    Pulmonary:      Effort: Pulmonary effort is normal. No respiratory distress. Breath sounds: Normal breath sounds. No rales. Musculoskeletal:      Cervical back: Normal range of motion and neck supple. Right lower leg: No edema. Left lower leg: No edema. Lymphadenopathy:      Cervical: No cervical adenopathy. Skin:     General: Skin is warm and dry. Neurological:      Mental Status: She is alert and oriented to person, place, and time. Psychiatric:         Mood and Affect: Mood normal.         Behavior: Behavior normal.       ASSESSMENT/PLAN:    Rich Jordan was seen today for follow-up. Diagnoses and all orders for this visit:    Mixed hyperlipidemia  -     Comprehensive Metabolic Panel, Fasting; Future  -     Lipid, Fasting; Future  -     TSH with Reflex; Future    Malignant neoplasm of upper lobe, left bronchus or lung ([address]  Continues to do well after surgical removal of the left upper lobe    Stage IIIb chronic kidney disease ([facility] [address]    Atherosclerosis of native arteries of extremities with intermittent claudication, bilateral legs (HCC)    Sick sinus syndrome [facility])  Doing well after pacemaker placed in May of last year. Coronary artery disease involving native coronary artery of native heart with angina pectoris ([facility] [address]  No recent angina. Followed by cardiology. Diarrhea, unspecified type  This has been a chronic problem. Patient is willing to try WelChol.  -     colesevelam (WELCHOL) 625 MG tablet; Take 1 tablet by mouth 2 times daily (with meals)      Return in about 3 months (around 5/3/2023). Please note portions of this note were completed with a voicerecognition program.  Efforts were made to edit the dictations but occasionally words are mis-transcribed.

## 2023-03-28 DIAGNOSIS — M79.604 PAIN IN BOTH LOWER EXTREMITIES: ICD-10-CM

## 2023-03-28 DIAGNOSIS — G62.9 POLYNEUROPATHY: ICD-10-CM

## 2023-03-28 DIAGNOSIS — M79.605 PAIN IN BOTH LOWER EXTREMITIES: ICD-10-CM

## 2023-03-29 NOTE — TELEPHONE ENCOUNTER
Contract 9/30/22    Rx Refill Note  Requested Prescriptions     Pending Prescriptions Disp Refills   • pregabalin (LYRICA) 225 MG capsule [Pharmacy Med Name: PREGABALIN 225 MG CAPSULE] 90 capsule      Sig: TAKE ONE CAPSULE BY MOUTH THREE TIMES A DAY      Last office visit with prescribing clinician: 9/30/2022   Last telemedicine visit with prescribing clinician: 5/5/2023   Next office visit with prescribing clinician: 5/5/2023       Tracy Brumfield MA  03/29/23, 08:42 EDT

## 2023-03-30 RX ORDER — PREGABALIN 225 MG/1
CAPSULE ORAL
Qty: 90 CAPSULE | Refills: 2 | Status: SHIPPED | OUTPATIENT
Start: 2023-03-30

## 2023-09-06 ENCOUNTER — OFFICE VISIT (OUTPATIENT)
Dept: INTERNAL MEDICINE | Facility: CLINIC | Age: 58
End: 2023-09-06
Payer: COMMERCIAL

## 2023-09-06 VITALS
WEIGHT: 206.4 LBS | HEART RATE: 96 BPM | BODY MASS INDEX: 31.28 KG/M2 | DIASTOLIC BLOOD PRESSURE: 92 MMHG | SYSTOLIC BLOOD PRESSURE: 170 MMHG | HEIGHT: 68 IN | OXYGEN SATURATION: 94 % | RESPIRATION RATE: 20 BRPM

## 2023-09-06 DIAGNOSIS — E11.42 CONTROLLED TYPE 2 DIABETES MELLITUS WITH DIABETIC POLYNEUROPATHY, WITHOUT LONG-TERM CURRENT USE OF INSULIN: ICD-10-CM

## 2023-09-06 DIAGNOSIS — M79.605 PAIN IN BOTH LOWER EXTREMITIES: ICD-10-CM

## 2023-09-06 DIAGNOSIS — E53.8 B12 DEFICIENCY: ICD-10-CM

## 2023-09-06 DIAGNOSIS — G60.9 IDIOPATHIC PERIPHERAL NEUROPATHY: ICD-10-CM

## 2023-09-06 DIAGNOSIS — M79.604 PAIN IN BOTH LOWER EXTREMITIES: ICD-10-CM

## 2023-09-06 DIAGNOSIS — N40.0 BENIGN PROSTATIC HYPERPLASIA WITHOUT LOWER URINARY TRACT SYMPTOMS: ICD-10-CM

## 2023-09-06 DIAGNOSIS — G62.9 POLYNEUROPATHY: ICD-10-CM

## 2023-09-06 DIAGNOSIS — H61.21 CERUMINOSIS, RIGHT: Primary | ICD-10-CM

## 2023-09-06 PROCEDURE — 99214 OFFICE O/P EST MOD 30 MIN: CPT | Performed by: FAMILY MEDICINE

## 2023-09-06 RX ORDER — IRBESARTAN 75 MG/1
75 TABLET ORAL DAILY
Qty: 90 TABLET | Refills: 3 | Status: SHIPPED | OUTPATIENT
Start: 2023-09-06

## 2023-09-06 RX ORDER — IRBESARTAN 75 MG/1
75 TABLET ORAL DAILY
Qty: 90 TABLET | Refills: 3 | Status: SHIPPED | OUTPATIENT
Start: 2023-09-06 | End: 2023-09-06 | Stop reason: SDUPTHER

## 2023-09-06 RX ORDER — PREGABALIN 225 MG/1
CAPSULE ORAL
Qty: 90 CAPSULE | Refills: 5 | Status: SHIPPED | OUTPATIENT
Start: 2023-09-06

## 2023-09-06 RX ORDER — NEOMYCIN SULFATE, POLYMYXIN B SULFATE AND HYDROCORTISONE 10; 3.5; 1 MG/ML; MG/ML; [USP'U]/ML
3 SUSPENSION/ DROPS AURICULAR (OTIC) 4 TIMES DAILY
Qty: 10 ML | Refills: 0 | Status: SHIPPED | OUTPATIENT
Start: 2023-09-06

## 2023-09-06 NOTE — PROGRESS NOTES
"Chief Complaint  Diabetes, Med Refill, and Burn (On left hand index finger)    Subjective        Brady Mota presents to [facility]  History of Present Illness  Amol is a delightful hammad who works a lot at home.  Question is control of diabetes and also history of idiopathic peripheral neuropathy legs.  This is controlled with Lyrica 225 mg 3 times daily.  Problem as it seems to enhance weight gain which is led to diabetes type 2.  We will try to get Ozempic covered at 0.25 subcu weekly starting dose continue current dose of metformin.    Blood pressure is elevated and we will also start irbesartan 75 mg starting dose daily for blood pressure and protect the kidneys.    He has some sensation of blockage in right ear and he has evidence of cerumen impaction to a certain extent which is relieved with lighted curette and we will get some Cortisporin otic suspension up to 4 times daily for up to 7 days with follow-up with Dr. Mann ENT if no better.  Diabetes  Answers submitted by the patient for this visit:  Primary Reason for Visit (Submitted on 7/26/2023)  What is the primary reason for your visit?: Diabetes      Objective   Vital Signs:  /92 (BP Location: Left arm)   Pulse 96   Resp 20   Ht 172.7 cm (67.99\")   Wt 93.6 kg (206 lb 6.4 oz)   SpO2 94%   BMI 31.39 kg/m²   Estimated body mass index is 31.39 kg/m² as calculated from the following:    Height as of this encounter: 172.7 cm (67.99\").    Weight as of this encounter: 93.6 kg (206 lb 6.4 oz).             Physical Exam  Vitals reviewed.   Constitutional:       Appearance: He is well-developed. He is obese.   HENT:      Head: Normocephalic and atraumatic.      Right Ear: Tympanic membrane and external ear normal.      Left Ear: Tympanic membrane and external ear normal.      Nose: Nose normal.   Eyes:      Conjunctiva/sclera: Conjunctivae normal.      Pupils: Pupils are equal, round, and reactive to light.   Neck:      " Thyroid: No thyromegaly.      Vascular: No JVD.   Cardiovascular:      Rate and Rhythm: Normal rate and regular rhythm.      Heart sounds: Normal heart sounds.   Pulmonary:      Effort: Pulmonary effort is normal.      Breath sounds: Normal breath sounds.   Abdominal:      General: Bowel sounds are normal.      Palpations: Abdomen is soft.   Musculoskeletal:         General: Normal range of motion.      Cervical back: Normal range of motion and neck supple.   Lymphadenopathy:      Cervical: No cervical adenopathy.   Skin:     General: Skin is warm and dry.      Findings: No rash.   Neurological:      Mental Status: He is alert and oriented to person, place, and time.      Cranial Nerves: No cranial nerve deficit.      Sensory: Sensory deficit present.      Coordination: Coordination normal.   Psychiatric:         Behavior: Behavior normal.         Thought Content: Thought content normal.         Judgment: Judgment normal.      Result Review :  The following data was reviewed by: Usama Carter MD on 09/06/2023:  Common labs          9/30/2022    10:09   Common Labs   Glucose 121    BUN 14    Creatinine 0.80    Sodium 143    Potassium 4.9    Chloride 99    Calcium 10.2    Total Protein 7.0    Albumin 5.00    Total Bilirubin 0.4    Alkaline Phosphatase 81    AST (SGOT) 28    ALT (SGPT) 32    WBC 6.35    Hemoglobin 16.6    Hematocrit 48.8    Platelets 177    Total Cholesterol 192    Triglycerides 485    HDL Cholesterol 36    LDL Cholesterol  79    Hemoglobin A1C 6.70    PSA 1.3                   Assessment and Plan   Diagnoses and all orders for this visit:    1. Ceruminosis, right (Primary)  Comments:  Cortisporin otic suspension 4 times daily follow-up with Dr. Mann if no better  Orders:  -     Lipid Panel With / Chol / HDL Ratio; Future    2. Polyneuropathy  Comments:  Continue Lyrica  225 mg 3 times daily  Orders:  -     pregabalin (LYRICA) 225 MG capsule; TAKE ONE CAPSULE BY MOUTH THREE TIMES A DAY  Dispense: 90  capsule; Refill: 5  -     Urinalysis With Microscopic If Indicated (No Culture) - Urine, Clean Catch; Future  -     TSH; Future  -     T4, Free; Future  -     T3, Free; Future  -     Lipid Panel With / Chol / HDL Ratio; Future  -     CBC & Differential; Future  -     Comprehensive Metabolic Panel; Future  -     Hemoglobin A1c; Future  -     Vitamin B12; Future  -     PSA Total, Reflex To Free; Future  -     MicroAlbumin, Urine, Random - Urine, Clean Catch; Future    3. Pain in both lower extremities  -     pregabalin (LYRICA) 225 MG capsule; TAKE ONE CAPSULE BY MOUTH THREE TIMES A DAY  Dispense: 90 capsule; Refill: 5  -     Lipid Panel With / Chol / HDL Ratio; Future    4. Controlled type 2 diabetes mellitus with diabetic polyneuropathy, without long-term current use of insulin  Comments:  Add Ozempic escalating dose start 1.25 subcu weekly discussed side effects.  May continue metformin at current dose.  Orders:  -     Urinalysis With Microscopic If Indicated (No Culture) - Urine, Clean Catch; Future  -     TSH; Future  -     T4, Free; Future  -     T3, Free; Future  -     Lipid Panel With / Chol / HDL Ratio; Future  -     CBC & Differential; Future  -     Comprehensive Metabolic Panel; Future  -     Hemoglobin A1c; Future  -     Vitamin B12; Future  -     PSA Total, Reflex To Free; Future  -     MicroAlbumin, Urine, Random - Urine, Clean Catch; Future    5. Idiopathic peripheral neuropathy  -     Urinalysis With Microscopic If Indicated (No Culture) - Urine, Clean Catch; Future  -     TSH; Future  -     T4, Free; Future  -     T3, Free; Future  -     Lipid Panel With / Chol / HDL Ratio; Future  -     CBC & Differential; Future  -     Comprehensive Metabolic Panel; Future  -     Hemoglobin A1c; Future  -     Vitamin B12; Future  -     PSA Total, Reflex To Free; Future  -     MicroAlbumin, Urine, Random - Urine, Clean Catch; Future    6. B12 deficiency  -     Lipid Panel With / Chol / HDL Ratio; Future  -      Vitamin B12; Future    7. Benign prostatic hyperplasia without lower urinary tract symptoms  -     PSA Total, Reflex To Free; Future    Other orders  -     neomycin-polymyxin-hydrocortisone (CORTISPORIN) 3.5-12142-8 otic suspension; Administer 3 drops to the right ear 4 (Four) Times a Day. For up to 7 days  Dispense: 10 mL; Refill: 0  -     Discontinue: irbesartan (Avapro) 75 MG tablet; Take 1 tablet by mouth Daily. For blood pressure  Dispense: 90 tablet; Refill: 3  -     irbesartan (Avapro) 75 MG tablet; Take 1 tablet by mouth Daily. For blood pressure  Dispense: 90 tablet; Refill: 3  -     Semaglutide,0.25 or 0.5MG/DOS, (OZEMPIC) 2 MG/1.5ML solution pen-injector; Inject 0.25 mg under the skin into the appropriate area as directed 1 (One) Time Per Week.  Dispense: 1.5 mL; Refill: 1             Follow Up   No follow-ups on file.  Patient was given instructions and counseling regarding his condition or for health maintenance advice. Please see specific information pulled into the AVS if appropriate.

## 2023-09-07 DIAGNOSIS — G62.9 POLYNEUROPATHY: ICD-10-CM

## 2023-09-07 DIAGNOSIS — E11.42 CONTROLLED TYPE 2 DIABETES MELLITUS WITH DIABETIC POLYNEUROPATHY, WITHOUT LONG-TERM CURRENT USE OF INSULIN: Primary | ICD-10-CM

## 2023-09-07 DIAGNOSIS — G60.9 IDIOPATHIC PERIPHERAL NEUROPATHY: ICD-10-CM

## 2023-10-21 DIAGNOSIS — E78.2 MIXED HYPERLIPIDEMIA: ICD-10-CM

## 2023-10-21 DIAGNOSIS — F32.A DEPRESSION, UNSPECIFIED DEPRESSION TYPE: ICD-10-CM

## 2023-10-21 DIAGNOSIS — E11.42 CONTROLLED TYPE 2 DIABETES MELLITUS WITH DIABETIC POLYNEUROPATHY, WITHOUT LONG-TERM CURRENT USE OF INSULIN: Primary | ICD-10-CM

## 2023-10-23 RX ORDER — ROSUVASTATIN CALCIUM 5 MG/1
5 TABLET, COATED ORAL DAILY
Qty: 90 TABLET | Refills: 3 | Status: SHIPPED | OUTPATIENT
Start: 2023-10-23

## 2023-10-23 RX ORDER — DULOXETIN HYDROCHLORIDE 60 MG/1
60 CAPSULE, DELAYED RELEASE ORAL DAILY
Qty: 90 CAPSULE | Refills: 3 | Status: SHIPPED | OUTPATIENT
Start: 2023-10-23

## 2023-10-23 NOTE — TELEPHONE ENCOUNTER
Rx Refill Note  Requested Prescriptions     Pending Prescriptions Disp Refills    metFORMIN (GLUCOPHAGE) 500 MG tablet [Pharmacy Med Name: METFORMIN 500MG TABLETS] 360 tablet 3     Sig: TAKE 2 TABLETS BY MOUTH TWICE DAILY WITH MEALS    DULoxetine (CYMBALTA) 60 MG capsule [Pharmacy Med Name: DULOXETINE DR 60MG CAPSULES] 90 capsule 3     Sig: TAKE 1 CAPSULE BY MOUTH DAILY    rosuvastatin (CRESTOR) 5 MG tablet [Pharmacy Med Name: ROSUVASTATIN 5MG TABLETS] 90 tablet 3     Sig: TAKE 1 TABLET BY MOUTH DAILY      Last office visit with prescribing clinician: 9/6/2023   Last telemedicine visit with prescribing clinician: Visit date not found   Next office visit with prescribing clinician: 11/28/2023       Tracy Brumfield MA  10/23/23, 10:45 EDT

## 2023-11-28 ENCOUNTER — OFFICE VISIT (OUTPATIENT)
Dept: INTERNAL MEDICINE | Facility: CLINIC | Age: 58
End: 2023-11-28
Payer: COMMERCIAL

## 2023-11-28 VITALS
BODY MASS INDEX: 30.62 KG/M2 | DIASTOLIC BLOOD PRESSURE: 80 MMHG | WEIGHT: 202 LBS | SYSTOLIC BLOOD PRESSURE: 128 MMHG | HEART RATE: 85 BPM | OXYGEN SATURATION: 99 % | HEIGHT: 68 IN

## 2023-11-28 DIAGNOSIS — E53.8 B12 DEFICIENCY: ICD-10-CM

## 2023-11-28 DIAGNOSIS — N40.0 BENIGN PROSTATIC HYPERPLASIA WITHOUT LOWER URINARY TRACT SYMPTOMS: ICD-10-CM

## 2023-11-28 DIAGNOSIS — R20.0 NUMBNESS OF FEET: ICD-10-CM

## 2023-11-28 DIAGNOSIS — Z12.11 COLON CANCER SCREENING: ICD-10-CM

## 2023-11-28 DIAGNOSIS — Z86.69 HISTORY OF SCIATICA: ICD-10-CM

## 2023-11-28 DIAGNOSIS — E11.42 CONTROLLED TYPE 2 DIABETES MELLITUS WITH DIABETIC POLYNEUROPATHY, WITHOUT LONG-TERM CURRENT USE OF INSULIN: ICD-10-CM

## 2023-11-28 DIAGNOSIS — K76.0 HEPATIC STEATOSIS: ICD-10-CM

## 2023-11-28 DIAGNOSIS — I10 PRIMARY HYPERTENSION: ICD-10-CM

## 2023-11-28 DIAGNOSIS — Z00.00 ANNUAL PHYSICAL EXAM: Primary | ICD-10-CM

## 2023-11-28 DIAGNOSIS — E11.40 TYPE 2 DIABETES MELLITUS WITH DIABETIC NEUROPATHY, WITHOUT LONG-TERM CURRENT USE OF INSULIN: ICD-10-CM

## 2023-11-28 DIAGNOSIS — E78.2 MIXED HYPERLIPIDEMIA: ICD-10-CM

## 2023-11-28 NOTE — PROGRESS NOTES
"Chief Complaint  Annual Exam    Subjective        Brady Mota presents to [facility]  History of Present Illness  Amol is a delightful hammad who is somewhat sedentary job and does walk on a treadmill periodically with precautions based on the neuropathy in his feet and numbness.  Otherwise he is on escalating dose of Ozempic and appears to be doing quite well with that and his AST and ALT have improved as far as fatty liver numbers and his A1c is improved also.  His cholesterol panel and triglycerides appear improved from prior numbers.  For metabolic syndrome parameters he overall appears improved.    We will place an order for screening colonoscopy with the last one being done in 2013.    Otherwise next visit we will check PSA prostate screening given some diminishment in urinary flow.      Objective   Vital Signs:  /80 (BP Location: Left arm, Patient Position: Sitting, Cuff Size: Adult)   Pulse 85   Ht 172.7 cm (67.99\")   Wt 91.6 kg (202 lb)   SpO2 99%   BMI 30.72 kg/m²   Estimated body mass index is 30.72 kg/m² as calculated from the following:    Height as of this encounter: 172.7 cm (67.99\").    Weight as of this encounter: 91.6 kg (202 lb).               Physical Exam  Vitals reviewed.   Constitutional:       Appearance: He is well-developed.   HENT:      Head: Normocephalic and atraumatic.      Right Ear: Tympanic membrane and external ear normal.      Left Ear: Tympanic membrane and external ear normal.      Nose: Nose normal.   Eyes:      Conjunctiva/sclera: Conjunctivae normal.      Pupils: Pupils are equal, round, and reactive to light.   Neck:      Thyroid: No thyromegaly.      Vascular: No JVD.   Cardiovascular:      Rate and Rhythm: Normal rate and regular rhythm.      Heart sounds: Normal heart sounds.   Pulmonary:      Effort: Pulmonary effort is normal.      Breath sounds: Normal breath sounds.   Abdominal:      General: Bowel sounds are normal.      " Palpations: Abdomen is soft.   Musculoskeletal:         General: Normal range of motion.      Cervical back: Normal range of motion and neck supple.   Lymphadenopathy:      Cervical: No cervical adenopathy.   Skin:     General: Skin is warm and dry.      Findings: No rash.   Neurological:      Mental Status: He is alert and oriented to person, place, and time.      Cranial Nerves: No cranial nerve deficit.      Sensory: Sensory deficit present.      Coordination: Coordination normal.      Comments: Decreased sensation reported in both feet   Psychiatric:         Behavior: Behavior normal.         Thought Content: Thought content normal.         Judgment: Judgment normal.        Result Review :  The following data was reviewed by: Usama Carter MD on 11/28/2023:  Common labs          11/22/2023    11:37   Common Labs   Glucose 117    BUN 14    Creatinine 0.82    Sodium 138    Potassium 4.9    Chloride 95    Calcium 9.9    Total Protein 7.2    Albumin 4.6    Total Bilirubin 0.5    Alkaline Phosphatase 75    AST (SGOT) 22    ALT (SGPT) 21    WBC 5.2    Hemoglobin 16.9    Hematocrit 49.9    Platelets 192    Total Cholesterol 132    Triglycerides 172    HDL Cholesterol 31    LDL Cholesterol  72    Hemoglobin A1C 6.3    Microalbumin, Urine 11.8    PSA 1.4      Data reviewed : GI studies reviewed colonoscopy performed 2013             Assessment and Plan   Diagnoses and all orders for this visit:    1. Annual physical exam (Primary)  Comments:  Discussed cardiovascular wellness improvement in glucose tolerance.  Overall he has improved his general laboratory metabolic numbers.    2. Controlled type 2 diabetes mellitus with diabetic polyneuropathy, without long-term current use of insulin  -     PSA, Total & Free; Future  -     Urinalysis With Microscopic If Indicated (No Culture) - Urine, Clean Catch; Future  -     TSH; Future  -     T4, Free; Future  -     T3, Free; Future  -     Lipid Panel With / Chol / HDL Ratio;  Future  -     CBC & Differential; Future  -     Comprehensive Metabolic Panel; Future  -     Hemoglobin A1c; Future  -     Vitamin B12; Future  -     Folate; Future    3. Mixed hyperlipidemia  Comments:  Rosuvastatin 5 mg daily  Orders:  -     PSA, Total & Free; Future  -     Urinalysis With Microscopic If Indicated (No Culture) - Urine, Clean Catch; Future  -     TSH; Future  -     T4, Free; Future  -     T3, Free; Future  -     Lipid Panel With / Chol / HDL Ratio; Future  -     CBC & Differential; Future  -     Comprehensive Metabolic Panel; Future  -     Hemoglobin A1c; Future  -     Vitamin B12; Future  -     Folate; Future    4. Colon cancer screening  -     Ambulatory Referral For Screening Colonoscopy    5. Type 2 diabetes mellitus with diabetic neuropathy, without long-term current use of insulin  Comments:  Escalating dose of semaglutide/Ozempic next dose 0.5 subcu weekly continue metformin 5 mg 2 tablets twice daily  Orders:  -     PSA, Total & Free; Future  -     Urinalysis With Microscopic If Indicated (No Culture) - Urine, Clean Catch; Future  -     TSH; Future  -     T4, Free; Future  -     T3, Free; Future  -     Lipid Panel With / Chol / HDL Ratio; Future  -     CBC & Differential; Future  -     Comprehensive Metabolic Panel; Future  -     Hemoglobin A1c; Future  -     Vitamin B12; Future  -     Folate; Future    6. Numbness of feet  Comments:  Lyrica to 25 3 times daily plus duloxetine 60 mg daily  Orders:  -     PSA, Total & Free; Future  -     Urinalysis With Microscopic If Indicated (No Culture) - Urine, Clean Catch; Future  -     TSH; Future  -     T4, Free; Future  -     T3, Free; Future  -     Lipid Panel With / Chol / HDL Ratio; Future  -     CBC & Differential; Future  -     Comprehensive Metabolic Panel; Future  -     Hemoglobin A1c; Future  -     Vitamin B12; Future  -     Folate; Future    7. History of sciatica  Comments:  May need MRI of lumbar spine at some juncture    8. Hepatic  steatosis  Comments:  Improved with current improvement in blood sugar  Orders:  -     PSA, Total & Free; Future  -     Urinalysis With Microscopic If Indicated (No Culture) - Urine, Clean Catch; Future  -     TSH; Future  -     T4, Free; Future  -     T3, Free; Future  -     Lipid Panel With / Chol / HDL Ratio; Future  -     CBC & Differential; Future  -     Comprehensive Metabolic Panel; Future  -     Hemoglobin A1c; Future  -     Vitamin B12; Future  -     Folate; Future    9. Primary hypertension  Comments:  Irbesartan 75 mg daily  Orders:  -     PSA, Total & Free; Future  -     Urinalysis With Microscopic If Indicated (No Culture) - Urine, Clean Catch; Future  -     TSH; Future  -     T4, Free; Future  -     T3, Free; Future  -     Lipid Panel With / Chol / HDL Ratio; Future  -     CBC & Differential; Future  -     Comprehensive Metabolic Panel; Future  -     Hemoglobin A1c; Future  -     Vitamin B12; Future  -     Folate; Future    10. B12 deficiency  -     PSA, Total & Free; Future  -     Urinalysis With Microscopic If Indicated (No Culture) - Urine, Clean Catch; Future  -     TSH; Future  -     T4, Free; Future  -     T3, Free; Future  -     Lipid Panel With / Chol / HDL Ratio; Future  -     CBC & Differential; Future  -     Comprehensive Metabolic Panel; Future  -     Hemoglobin A1c; Future  -     Vitamin B12; Future  -     Folate; Future    11. Benign prostatic hyperplasia without lower urinary tract symptoms  -     PSA, Total & Free; Future    Other orders  -     Semaglutide,0.25 or 0.5MG/DOS, (OZEMPIC) 2 MG/1.5ML solution pen-injector; Inject 0.5 mg under the skin into the appropriate area as directed 1 (One) Time Per Week.  Dispense: 1.5 mL; Refill: 1             Follow Up   No follow-ups on file.  Patient was given instructions and counseling regarding his condition or for health maintenance advice. Please see specific information pulled into the AVS if appropriate.

## 2024-02-19 RX ORDER — SEMAGLUTIDE 0.68 MG/ML
INJECTION, SOLUTION SUBCUTANEOUS
Qty: 3 ML | Refills: 2 | Status: SHIPPED | OUTPATIENT
Start: 2024-02-19

## 2024-03-23 DIAGNOSIS — G62.9 POLYNEUROPATHY: ICD-10-CM

## 2024-03-23 DIAGNOSIS — M79.604 PAIN IN BOTH LOWER EXTREMITIES: ICD-10-CM

## 2024-03-23 DIAGNOSIS — M79.605 PAIN IN BOTH LOWER EXTREMITIES: ICD-10-CM

## 2024-03-25 ENCOUNTER — DOCUMENTATION (OUTPATIENT)
Dept: INTERNAL MEDICINE | Facility: CLINIC | Age: 59
End: 2024-03-25
Payer: COMMERCIAL

## 2024-03-25 DIAGNOSIS — G62.9 POLYNEUROPATHY: ICD-10-CM

## 2024-03-25 DIAGNOSIS — M79.604 PAIN IN BOTH LOWER EXTREMITIES: ICD-10-CM

## 2024-03-25 DIAGNOSIS — M79.605 PAIN IN BOTH LOWER EXTREMITIES: ICD-10-CM

## 2024-03-25 RX ORDER — PREGABALIN 225 MG/1
CAPSULE ORAL
Qty: 90 CAPSULE | Refills: 1 | Status: SHIPPED | OUTPATIENT
Start: 2024-03-25

## 2024-03-25 NOTE — TELEPHONE ENCOUNTER
Contract on file    Rx Refill Note  Requested Prescriptions     Pending Prescriptions Disp Refills    pregabalin (LYRICA) 225 MG capsule 90 capsule 5     Sig: TAKE ONE CAPSULE BY MOUTH THREE TIMES A DAY      Last office visit with prescribing clinician: 11/28/2023   Last telemedicine visit with prescribing clinician: Visit date not found   Next office visit with prescribing clinician: 6/4/2024       Tracy Brumfield MA  03/25/24, 10:52 EDT

## 2024-03-25 NOTE — TELEPHONE ENCOUNTER
Contract on file    Rx Refill Note  Requested Prescriptions     Pending Prescriptions Disp Refills    pregabalin (LYRICA) 225 MG capsule [Pharmacy Med Name: PREGABALIN 225 MG CAPSULE] 90 capsule      Sig: TAKE ONE CAPSULE BY MOUTH THREE TIMES A DAY      Last office visit with prescribing clinician: 11/28/2023   Last telemedicine visit with prescribing clinician: Visit date not found   Next office visit with prescribing clinician: 3/25/2024       Tracy Brumfield MA  03/25/24, 10:51 EDT

## 2024-03-25 NOTE — TELEPHONE ENCOUNTER
Caller: [employer]EMAKER    Relationship: PATIENT    Best call back number:     What is the best time to reach you:     Who are you requesting to speak with (clinical staff, provider,  specific staff member):     Do you know the name of the person who called:     What was the call regarding: PATIENT IS CALLING IN TO CHECK ON THE REFILL STATUS OF HIS PREGABLIN.  HE SAYS HE WILL TAKE HIS LAST DOSE TONIGHT AND WILL BE OUT.  HE SAYS HIS NEXT TIME TO TAKE THIS IS AT 4AM ON 03/26/27 SO HE NEEDS THIS REFILLED AS SOON AS POSSIBLE AS HIS PAIN IS REALLY BAD WITHOUT THE MEDICATION.     Is it okay if the provider responds through MyChart:

## 2024-03-25 NOTE — PROGRESS NOTES
Patient’s wife notified on call provider stating patient was waiting for refill of control medication Lyrica. Voice message responded to patient’s wife that patient’s PCP has been notified and refill will be taken care of by PCP.

## 2024-03-26 RX ORDER — PREGABALIN 225 MG/1
CAPSULE ORAL
Qty: 90 CAPSULE | Refills: 5 | OUTPATIENT
Start: 2024-03-26

## 2024-05-22 DIAGNOSIS — M79.605 PAIN IN BOTH LOWER EXTREMITIES: ICD-10-CM

## 2024-05-22 DIAGNOSIS — M79.604 PAIN IN BOTH LOWER EXTREMITIES: ICD-10-CM

## 2024-05-22 DIAGNOSIS — G62.9 POLYNEUROPATHY: ICD-10-CM

## 2024-05-22 RX ORDER — PREGABALIN 225 MG/1
CAPSULE ORAL
Qty: 90 CAPSULE | Refills: 0 | Status: SHIPPED | OUTPATIENT
Start: 2024-05-22

## 2024-05-22 NOTE — TELEPHONE ENCOUNTER
Rx Refill Note  Requested Prescriptions     Pending Prescriptions Disp Refills    pregabalin (LYRICA) 225 MG capsule [Pharmacy Med Name: PREGABALIN 225 MG CAPSULE] 90 capsule      Sig: TAKE 1 CAPSULE BY MOUTH 3 TIMES A DAY      Last office visit with prescribing clinician: 11/28/2023   Last telemedicine visit with prescribing clinician: Visit date not found   Next office visit with prescribing clinician: 6/4/2024                         Would you like a call back once the refill request has been completed: [] Yes [] No    If the office needs to give you a call back, can they leave a voicemail: [] Yes [] No    Cher Manzo  05/22/24, 16:34 EDT

## 2024-05-24 DIAGNOSIS — N40.0 BENIGN PROSTATIC HYPERPLASIA WITHOUT LOWER URINARY TRACT SYMPTOMS: ICD-10-CM

## 2024-05-24 DIAGNOSIS — R20.0 NUMBNESS OF FEET: ICD-10-CM

## 2024-05-24 DIAGNOSIS — E11.42 CONTROLLED TYPE 2 DIABETES MELLITUS WITH DIABETIC POLYNEUROPATHY, WITHOUT LONG-TERM CURRENT USE OF INSULIN: ICD-10-CM

## 2024-05-24 DIAGNOSIS — E78.2 MIXED HYPERLIPIDEMIA: ICD-10-CM

## 2024-05-24 DIAGNOSIS — E11.40 TYPE 2 DIABETES MELLITUS WITH DIABETIC NEUROPATHY, WITHOUT LONG-TERM CURRENT USE OF INSULIN: ICD-10-CM

## 2024-05-24 DIAGNOSIS — E53.8 B12 DEFICIENCY: ICD-10-CM

## 2024-05-24 DIAGNOSIS — K76.0 HEPATIC STEATOSIS: ICD-10-CM

## 2024-05-24 DIAGNOSIS — I10 PRIMARY HYPERTENSION: ICD-10-CM

## 2024-05-30 LAB
ALBUMIN SERPL-MCNC: 4.2 G/DL (ref 3.8–4.9)
ALBUMIN/GLOB SERPL: 1.8 {RATIO} (ref 1.2–2.2)
ALP SERPL-CCNC: 71 IU/L (ref 44–121)
ALT SERPL-CCNC: 12 IU/L (ref 0–44)
APPEARANCE UR: CLEAR
AST SERPL-CCNC: 18 IU/L (ref 0–40)
BASOPHILS # BLD AUTO: 0 X10E3/UL (ref 0–0.2)
BASOPHILS NFR BLD AUTO: 1 %
BILIRUB SERPL-MCNC: 0.4 MG/DL (ref 0–1.2)
BILIRUB UR QL STRIP: NEGATIVE
BUN SERPL-MCNC: 15 MG/DL (ref 6–24)
BUN/CREAT SERPL: 20 (ref 9–20)
CALCIUM SERPL-MCNC: 10 MG/DL (ref 8.7–10.2)
CHLORIDE SERPL-SCNC: 102 MMOL/L (ref 96–106)
CHOLEST SERPL-MCNC: 120 MG/DL (ref 100–199)
CHOLEST/HDLC SERPL: 3.4 RATIO (ref 0–5)
CO2 SERPL-SCNC: 23 MMOL/L (ref 20–29)
COLOR UR: YELLOW
CREAT SERPL-MCNC: 0.75 MG/DL (ref 0.76–1.27)
EGFRCR SERPLBLD CKD-EPI 2021: 105 ML/MIN/1.73
EOSINOPHIL # BLD AUTO: 0.4 X10E3/UL (ref 0–0.4)
EOSINOPHIL NFR BLD AUTO: 7 %
ERYTHROCYTE [DISTWIDTH] IN BLOOD BY AUTOMATED COUNT: 12.9 % (ref 11.6–15.4)
FOLATE SERPL-MCNC: >20 NG/ML
GLOBULIN SER CALC-MCNC: 2.4 G/DL (ref 1.5–4.5)
GLUCOSE SERPL-MCNC: 83 MG/DL (ref 70–99)
GLUCOSE UR QL STRIP: NEGATIVE
HBA1C MFR BLD: 5.8 % (ref 4.8–5.6)
HCT VFR BLD AUTO: 43.2 % (ref 37.5–51)
HDLC SERPL-MCNC: 35 MG/DL
HGB BLD-MCNC: 14.8 G/DL (ref 13–17.7)
HGB UR QL STRIP: NEGATIVE
IMM GRANULOCYTES # BLD AUTO: 0 X10E3/UL (ref 0–0.1)
IMM GRANULOCYTES NFR BLD AUTO: 0 %
KETONES UR QL STRIP: NEGATIVE
LDLC SERPL CALC-MCNC: 55 MG/DL (ref 0–99)
LEUKOCYTE ESTERASE UR QL STRIP: NEGATIVE
LYMPHOCYTES # BLD AUTO: 2.3 X10E3/UL (ref 0.7–3.1)
LYMPHOCYTES NFR BLD AUTO: 39 %
MCH RBC QN AUTO: 31 PG (ref 26.6–33)
MCHC RBC AUTO-ENTMCNC: 34.3 G/DL (ref 31.5–35.7)
MCV RBC AUTO: 91 FL (ref 79–97)
MICRO URNS: NORMAL
MONOCYTES # BLD AUTO: 0.5 X10E3/UL (ref 0.1–0.9)
MONOCYTES NFR BLD AUTO: 8 %
NEUTROPHILS # BLD AUTO: 2.7 X10E3/UL (ref 1.4–7)
NEUTROPHILS NFR BLD AUTO: 45 %
NITRITE UR QL STRIP: NEGATIVE
PH UR STRIP: 6.5 [PH] (ref 5–7.5)
PLATELET # BLD AUTO: 160 X10E3/UL (ref 150–450)
POTASSIUM SERPL-SCNC: 4.6 MMOL/L (ref 3.5–5.2)
PROT SERPL-MCNC: 6.6 G/DL (ref 6–8.5)
PROT UR QL STRIP: NEGATIVE
PSA FREE MFR SERPL: 11.4 %
PSA FREE SERPL-MCNC: 0.25 NG/ML
PSA SERPL-MCNC: 2.2 NG/ML (ref 0–4)
RBC # BLD AUTO: 4.77 X10E6/UL (ref 4.14–5.8)
SODIUM SERPL-SCNC: 143 MMOL/L (ref 134–144)
SP GR UR STRIP: 1.02 (ref 1–1.03)
T3FREE SERPL-MCNC: 3 PG/ML (ref 2–4.4)
T4 FREE SERPL-MCNC: 0.93 NG/DL (ref 0.82–1.77)
TRIGL SERPL-MCNC: 183 MG/DL (ref 0–149)
TSH SERPL DL<=0.005 MIU/L-ACNC: 0.69 UIU/ML (ref 0.45–4.5)
UROBILINOGEN UR STRIP-MCNC: 1 MG/DL (ref 0.2–1)
VIT B12 SERPL-MCNC: >2000 PG/ML (ref 232–1245)
VLDLC SERPL CALC-MCNC: 30 MG/DL (ref 5–40)
WBC # BLD AUTO: 5.9 X10E3/UL (ref 3.4–10.8)

## 2024-06-04 ENCOUNTER — HOSPITAL ENCOUNTER (OUTPATIENT)
Facility: HOSPITAL | Age: 59
Discharge: HOME | End: 2024-06-04
Payer: COMMERCIAL

## 2024-06-04 ENCOUNTER — TELEPHONE (OUTPATIENT)
Dept: INTERNAL MEDICINE | Facility: CLINIC | Age: 59
End: 2024-06-04

## 2024-06-04 ENCOUNTER — OFFICE VISIT (OUTPATIENT)
Dept: INTERNAL MEDICINE | Facility: CLINIC | Age: 59
End: 2024-06-04
Payer: COMMERCIAL

## 2024-06-04 VITALS
OXYGEN SATURATION: 95 % | HEART RATE: 82 BPM | SYSTOLIC BLOOD PRESSURE: 108 MMHG | WEIGHT: 190 LBS | DIASTOLIC BLOOD PRESSURE: 76 MMHG | BODY MASS INDEX: 28.9 KG/M2

## 2024-06-04 DIAGNOSIS — M54.41 CHRONIC BILATERAL LOW BACK PAIN WITH BILATERAL SCIATICA: ICD-10-CM

## 2024-06-04 DIAGNOSIS — M54.42 CHRONIC BILATERAL LOW BACK PAIN WITH BILATERAL SCIATICA: ICD-10-CM

## 2024-06-04 DIAGNOSIS — E78.2 MIXED HYPERLIPIDEMIA: ICD-10-CM

## 2024-06-04 DIAGNOSIS — M54.16 LUMBAR RADICULOPATHY: Primary | ICD-10-CM

## 2024-06-04 DIAGNOSIS — G89.29 CHRONIC BILATERAL LOW BACK PAIN WITH BILATERAL SCIATICA: ICD-10-CM

## 2024-06-04 DIAGNOSIS — M79.605 PAIN IN BOTH LOWER EXTREMITIES: ICD-10-CM

## 2024-06-04 DIAGNOSIS — E11.42 CONTROLLED TYPE 2 DIABETES MELLITUS WITH DIABETIC POLYNEUROPATHY, WITHOUT LONG-TERM CURRENT USE OF INSULIN: ICD-10-CM

## 2024-06-04 DIAGNOSIS — M79.604 PAIN IN BOTH LOWER EXTREMITIES: ICD-10-CM

## 2024-06-04 DIAGNOSIS — G59 MONONEUROPATHY DUE TO UNDERLYING DISEASE: ICD-10-CM

## 2024-06-04 DIAGNOSIS — G62.9 POLYNEUROPATHY: ICD-10-CM

## 2024-06-04 DIAGNOSIS — M54.16 LUMBAR RADICULOPATHY: ICD-10-CM

## 2024-06-04 PROCEDURE — 72114 X-RAY EXAM L-S SPINE BENDING: CPT

## 2024-06-04 PROCEDURE — 99214 OFFICE O/P EST MOD 30 MIN: CPT | Performed by: FAMILY MEDICINE

## 2024-06-04 RX ORDER — LIDOCAINE 50 MG/G
2 PATCH TOPICAL EVERY 24 HOURS
Qty: 60 PATCH | Refills: 11 | Status: SHIPPED | OUTPATIENT
Start: 2024-06-04

## 2024-06-04 RX ORDER — PREGABALIN 225 MG/1
CAPSULE ORAL
Qty: 90 CAPSULE | Refills: 5 | Status: SHIPPED | OUTPATIENT
Start: 2024-06-04

## 2024-06-04 RX ORDER — ROSUVASTATIN CALCIUM 5 MG/1
5 TABLET, COATED ORAL DAILY
Qty: 90 TABLET | Refills: 3 | Status: SHIPPED | OUTPATIENT
Start: 2024-06-04

## 2024-06-04 NOTE — TELEPHONE ENCOUNTER
"  Caller: Brady Mota \"Amol\"    Relationship: Self    Best call back number: [phone]    What is the best time to reach you: ANYTIME     Who are you requesting to speak with (clinical staff, provider,  specific staff member): CLINICAL     What was the call regarding: PATIENT STATES THAT A PRIOR AUTHORIZATION IS NEEDED ON HIS LIDOCAINE PATCHES.     "

## 2024-06-04 NOTE — PROGRESS NOTES
"Chief Complaint  Hyperlipidemia, Diabetes, and Heartburn    Subjective        Brady Mota presents to [facility]  History of Present Illness  Amol is a delightful hammad who is battled idiopathic polyneuropathy in the lower extremities but now has back pain with radicular findings and sciatica bilaterally.  Will get flexion-extension views of the back but otherwise continue treatment for hyperlipidemia which is effective with Crestor 5 mg daily and also continue treatment of diabetes type 2 which is improved on Ozempic 0.5 mg subcu weekly.    Continue Lyrica for treatment of polyneuropathy and get as noted x-rays of the back.  He will check on the referral status of his colonoscopy referral with Dr. Choi.      Hyperlipidemia    Diabetes    Heartburn        Objective   Vital Signs:  /76 (BP Location: Left arm, Patient Position: Sitting, Cuff Size: Adult)   Pulse 82   Wt 86.2 kg (190 lb)   SpO2 95%   BMI 28.90 kg/m²   Estimated body mass index is 28.9 kg/m² as calculated from the following:    Height as of 11/28/23: 172.7 cm (67.99\").    Weight as of this encounter: 86.2 kg (190 lb).               Physical Exam  Vitals reviewed.   Constitutional:       Appearance: He is well-developed.   HENT:      Head: Normocephalic and atraumatic.      Right Ear: Tympanic membrane and external ear normal.      Left Ear: Tympanic membrane and external ear normal.      Nose: Nose normal.   Eyes:      Conjunctiva/sclera: Conjunctivae normal.      Pupils: Pupils are equal, round, and reactive to light.   Neck:      Thyroid: No thyromegaly.      Vascular: No JVD.   Cardiovascular:      Rate and Rhythm: Normal rate and regular rhythm.      Heart sounds: Normal heart sounds.   Pulmonary:      Effort: Pulmonary effort is normal.      Breath sounds: Normal breath sounds.   Abdominal:      General: Bowel sounds are normal.      Palpations: Abdomen is soft.   Musculoskeletal:      Cervical back: " Normal range of motion and neck supple.      Lumbar back: Decreased range of motion.   Lymphadenopathy:      Cervical: No cervical adenopathy.   Skin:     General: Skin is warm and dry.      Findings: No rash.   Neurological:      Mental Status: He is alert and oriented to person, place, and time.      Cranial Nerves: No cranial nerve deficit.      Sensory: Sensory deficit present.      Coordination: Coordination abnormal.      Gait: Gait is intact.      Deep Tendon Reflexes:      Reflex Scores:       Patellar reflexes are 3+ on the right side and 3+ on the left side.       Achilles reflexes are 0 on the right side and 0 on the left side.  Psychiatric:         Behavior: Behavior normal.         Thought Content: Thought content normal.         Judgment: Judgment normal.        Result Review :    The following data was reviewed by: Usama Carter MD on 06/04/2024:  Common labs          11/22/2023    11:37 5/29/2024    00:00   Common Labs   Glucose 117  83    BUN 14  15    Creatinine 0.82  0.75    Sodium 138  143    Potassium 4.9  4.6    Chloride 95  102    Calcium 9.9  10.0    Total Protein 7.2  6.6    Albumin 4.6  4.2    Total Bilirubin 0.5  0.4    Alkaline Phosphatase 75  71    AST (SGOT) 22  18    ALT (SGPT) 21  12    WBC 5.2  5.9    Hemoglobin 16.9  14.8    Hematocrit 49.9  43.2    Platelets 192  160    Total Cholesterol 132  120    Triglycerides 172  183    HDL Cholesterol 31  35    LDL Cholesterol  72  55    Hemoglobin A1C 6.3  5.8    Microalbumin, Urine 11.8     PSA 1.4  2.2                   Assessment and Plan     Assessment & Plan  Lumbar radiculopathy  Refill lidocaine patches 5% 2 patches up to 12 hours daily/x-ray lumbar flexion-extension views   Mononeuropathy due to underlying disease  Refill lidocaine patches 5% 2 patches up to 12 hours daily/x-ray lumbar flexion-extension views   Chronic bilateral low back pain with bilateral sciatica  Refill lidocaine patches 5% 2 patches up to 12 hours daily/x-ray  lumbar flexion-extension views   Mixed hyperlipidemia     Controlled type 2 diabetes mellitus with diabetic polyneuropathy, without long-term current use of insulin      Orders Placed This Encounter   Procedures    XR Spine Lumbar Complete With Flex & Ext     New Medications Ordered This Visit   Medications    lidocaine (LIDODERM) 5 %     Sig: Place 2 patches on the skin as directed by provider Daily. Remove & Discard patch within 12 hours or as directed by MD     Dispense:  60 patch     Refill:  11     Addendum will check on colonoscopy referral that was placed November 2023 to Dr. Choi         Follow Up     No follow-ups on file.  Patient was given instructions and counseling regarding his condition or for health maintenance advice. Please see specific information pulled into the AVS if appropriate.

## 2024-06-06 ENCOUNTER — PRIOR AUTHORIZATION (OUTPATIENT)
Dept: INTERNAL MEDICINE | Facility: CLINIC | Age: 59
End: 2024-06-06
Payer: COMMERCIAL

## 2024-06-14 ENCOUNTER — TELEPHONE (OUTPATIENT)
Dept: INTERNAL MEDICINE | Facility: CLINIC | Age: 59
End: 2024-06-14
Payer: COMMERCIAL

## 2024-07-01 ENCOUNTER — DOCUMENTATION (OUTPATIENT)
Dept: INTERNAL MEDICINE | Facility: CLINIC | Age: 59
End: 2024-07-01
Payer: COMMERCIAL

## 2024-07-01 DIAGNOSIS — M79.604 PAIN IN BOTH LOWER EXTREMITIES: ICD-10-CM

## 2024-07-01 DIAGNOSIS — M79.605 PAIN IN BOTH LOWER EXTREMITIES: ICD-10-CM

## 2024-07-01 DIAGNOSIS — G62.9 POLYNEUROPATHY: ICD-10-CM

## 2024-07-01 RX ORDER — PREGABALIN 225 MG/1
CAPSULE ORAL
Qty: 90 CAPSULE | Refills: 5 | Status: SHIPPED | OUTPATIENT
Start: 2024-07-01

## 2024-07-01 NOTE — TELEPHONE ENCOUNTER
"    Caller: Brady Mota \"Amol\"    Relationship: Self    Best call back number: [phone]     Requested Prescriptions:   Requested Prescriptions     Pending Prescriptions Disp Refills    pregabalin (LYRICA) 225 MG capsule 90 capsule 5     Sig: TAKE 1 CAPSULE BY MOUTH 3 TIMES A DAY        Pharmacy where request should be sent: [facility] [id number] [facility])  [phone] [facility] [phone]      Last office visit with prescribing clinician: 6/4/2024   Last telemedicine visit with prescribing clinician: Visit date not found   Next office visit with prescribing clinician: 12/10/2024     Additional details provided by patient: PATIENT IS COMPLETELY OUT    Does the patient have less than a 3 day supply:  [x] Yes  [] No    Would you like a call back once the refill request has been completed: [] Yes [] No    If the office needs to give you a call back, can they leave a voicemail: [] Yes [] No    Tara Jerez Rep   07/01/24 08:13 EDT     "

## 2024-07-01 NOTE — PROGRESS NOTES
Patient’s wife notified on call provider stating patient needed controlled medication Lyrica sent in. Advised patient’s wife I notified PCP of request. Patient’s wife verbalized understanding.

## 2024-10-26 DIAGNOSIS — F32.A DEPRESSION, UNSPECIFIED DEPRESSION TYPE: ICD-10-CM

## 2024-10-26 DIAGNOSIS — E11.42 CONTROLLED TYPE 2 DIABETES MELLITUS WITH DIABETIC POLYNEUROPATHY, WITHOUT LONG-TERM CURRENT USE OF INSULIN: ICD-10-CM

## 2024-10-28 RX ORDER — DULOXETIN HYDROCHLORIDE 60 MG/1
60 CAPSULE, DELAYED RELEASE ORAL DAILY
Qty: 90 CAPSULE | Refills: 3 | Status: SHIPPED | OUTPATIENT
Start: 2024-10-28

## 2024-11-21 RX ORDER — SEMAGLUTIDE 0.68 MG/ML
INJECTION, SOLUTION SUBCUTANEOUS
Qty: 3 ML | Refills: 2 | Status: SHIPPED | OUTPATIENT
Start: 2024-11-21

## 2024-12-02 ENCOUNTER — TELEPHONE (OUTPATIENT)
Dept: INTERNAL MEDICINE | Facility: CLINIC | Age: 59
End: 2024-12-02
Payer: COMMERCIAL

## 2024-12-02 NOTE — TELEPHONE ENCOUNTER
"  Caller: Brady Mota \"Amol\"    Relationship to patient: Self    Best call back number: [phone]        Type of visit: LAB      "

## 2024-12-05 DIAGNOSIS — E11.40 TYPE 2 DIABETES MELLITUS WITH DIABETIC NEUROPATHY, WITHOUT LONG-TERM CURRENT USE OF INSULIN: ICD-10-CM

## 2024-12-05 DIAGNOSIS — E11.42 CONTROLLED TYPE 2 DIABETES MELLITUS WITH DIABETIC POLYNEUROPATHY, WITHOUT LONG-TERM CURRENT USE OF INSULIN: Primary | ICD-10-CM

## 2024-12-05 DIAGNOSIS — I10 PRIMARY HYPERTENSION: ICD-10-CM

## 2024-12-05 DIAGNOSIS — G59 MONONEUROPATHY DUE TO UNDERLYING DISEASE: ICD-10-CM

## 2024-12-05 DIAGNOSIS — M54.16 LUMBAR RADICULOPATHY: ICD-10-CM

## 2024-12-05 DIAGNOSIS — N40.0 BENIGN PROSTATIC HYPERPLASIA WITHOUT LOWER URINARY TRACT SYMPTOMS: ICD-10-CM

## 2024-12-05 DIAGNOSIS — E53.8 B12 DEFICIENCY: ICD-10-CM

## 2024-12-06 LAB
ALBUMIN SERPL-MCNC: 4.7 G/DL (ref 3.8–4.9)
ALP SERPL-CCNC: 78 IU/L (ref 44–121)
ALT SERPL-CCNC: 24 IU/L (ref 0–44)
APPEARANCE UR: CLEAR
AST SERPL-CCNC: 26 IU/L (ref 0–40)
BASOPHILS # BLD AUTO: 0.1 X10E3/UL (ref 0–0.2)
BASOPHILS NFR BLD AUTO: 0 %
BILIRUB SERPL-MCNC: 0.5 MG/DL (ref 0–1.2)
BILIRUB UR QL STRIP: NEGATIVE
BUN SERPL-MCNC: 14 MG/DL (ref 6–24)
BUN/CREAT SERPL: 19 (ref 9–20)
CALCIUM SERPL-MCNC: 10.9 MG/DL (ref 8.7–10.2)
CHLORIDE SERPL-SCNC: 99 MMOL/L (ref 96–106)
CHOLEST SERPL-MCNC: 206 MG/DL (ref 100–199)
CHOLEST/HDLC SERPL: 4.9 RATIO (ref 0–5)
CO2 SERPL-SCNC: 28 MMOL/L (ref 20–29)
COLOR UR: YELLOW
CREAT SERPL-MCNC: 0.75 MG/DL (ref 0.76–1.27)
EGFRCR SERPLBLD CKD-EPI 2021: 104 ML/MIN/1.73
EOSINOPHIL # BLD AUTO: 0.3 X10E3/UL (ref 0–0.4)
EOSINOPHIL NFR BLD AUTO: 2 %
ERYTHROCYTE [DISTWIDTH] IN BLOOD BY AUTOMATED COUNT: 12.7 % (ref 11.6–15.4)
FOLATE SERPL-MCNC: >20 NG/ML
GLOBULIN SER CALC-MCNC: 2.7 G/DL (ref 1.5–4.5)
GLUCOSE SERPL-MCNC: 121 MG/DL (ref 70–99)
GLUCOSE UR QL STRIP: NEGATIVE
HBA1C MFR BLD: 6.1 % (ref 4.8–5.6)
HCT VFR BLD AUTO: 50.1 % (ref 37.5–51)
HDLC SERPL-MCNC: 42 MG/DL
HGB BLD-MCNC: 16.6 G/DL (ref 13–17.7)
HGB UR QL STRIP: NEGATIVE
IMM GRANULOCYTES # BLD AUTO: 0 X10E3/UL (ref 0–0.1)
IMM GRANULOCYTES NFR BLD AUTO: 0 %
KETONES UR QL STRIP: NEGATIVE
LDLC SERPL CALC-MCNC: 107 MG/DL (ref 0–99)
LEUKOCYTE ESTERASE UR QL STRIP: NEGATIVE
LYMPHOCYTES # BLD AUTO: 1.8 X10E3/UL (ref 0.7–3.1)
LYMPHOCYTES NFR BLD AUTO: 12 %
MCH RBC QN AUTO: 30.3 PG (ref 26.6–33)
MCHC RBC AUTO-ENTMCNC: 33.1 G/DL (ref 31.5–35.7)
MCV RBC AUTO: 92 FL (ref 79–97)
MICRO URNS: NORMAL
MONOCYTES # BLD AUTO: 1.2 X10E3/UL (ref 0.1–0.9)
MONOCYTES NFR BLD AUTO: 8 %
NEUTROPHILS # BLD AUTO: 11.5 X10E3/UL (ref 1.4–7)
NEUTROPHILS NFR BLD AUTO: 78 %
NITRITE UR QL STRIP: NEGATIVE
PH UR STRIP: 5.5 [PH] (ref 5–7.5)
PLATELET # BLD AUTO: 242 X10E3/UL (ref 150–450)
POTASSIUM SERPL-SCNC: 5.1 MMOL/L (ref 3.5–5.2)
PROT SERPL-MCNC: 7.4 G/DL (ref 6–8.5)
PROT UR QL STRIP: NEGATIVE
PSA SERPL-MCNC: 1.1 NG/ML (ref 0–4)
RBC # BLD AUTO: 5.47 X10E6/UL (ref 4.14–5.8)
REFLEX: NORMAL
SODIUM SERPL-SCNC: 145 MMOL/L (ref 134–144)
SP GR UR STRIP: 1.03 (ref 1–1.03)
T3FREE SERPL-MCNC: 2.9 PG/ML (ref 2–4.4)
T4 FREE SERPL-MCNC: 1 NG/DL (ref 0.82–1.77)
TRIGL SERPL-MCNC: 332 MG/DL (ref 0–149)
TSH SERPL DL<=0.005 MIU/L-ACNC: 0.88 UIU/ML (ref 0.45–4.5)
UROBILINOGEN UR STRIP-MCNC: 0.2 MG/DL (ref 0.2–1)
VIT B12 SERPL-MCNC: >2000 PG/ML (ref 232–1245)
VLDLC SERPL CALC-MCNC: 57 MG/DL (ref 5–40)
WBC # BLD AUTO: 14.8 X10E3/UL (ref 3.4–10.8)

## 2024-12-10 ENCOUNTER — OFFICE VISIT (OUTPATIENT)
Dept: INTERNAL MEDICINE | Facility: CLINIC | Age: 59
End: 2024-12-10
Payer: COMMERCIAL

## 2024-12-10 VITALS
BODY MASS INDEX: 29.35 KG/M2 | WEIGHT: 193 LBS | OXYGEN SATURATION: 96 % | HEART RATE: 102 BPM | SYSTOLIC BLOOD PRESSURE: 126 MMHG | DIASTOLIC BLOOD PRESSURE: 84 MMHG

## 2024-12-10 DIAGNOSIS — M79.605 PAIN IN BOTH LOWER EXTREMITIES: ICD-10-CM

## 2024-12-10 DIAGNOSIS — M79.604 PAIN IN BOTH LOWER EXTREMITIES: ICD-10-CM

## 2024-12-10 DIAGNOSIS — G62.9 POLYNEUROPATHY: ICD-10-CM

## 2024-12-10 DIAGNOSIS — E11.42 CONTROLLED TYPE 2 DIABETES MELLITUS WITH DIABETIC POLYNEUROPATHY, WITHOUT LONG-TERM CURRENT USE OF INSULIN: ICD-10-CM

## 2024-12-10 DIAGNOSIS — E78.2 MIXED HYPERLIPIDEMIA: ICD-10-CM

## 2024-12-10 DIAGNOSIS — F32.A DEPRESSION, UNSPECIFIED DEPRESSION TYPE: ICD-10-CM

## 2024-12-10 DIAGNOSIS — Z00.00 ANNUAL PHYSICAL EXAM: Primary | ICD-10-CM

## 2024-12-10 PROCEDURE — 99396 PREV VISIT EST AGE 40-64: CPT | Performed by: FAMILY MEDICINE

## 2024-12-10 PROCEDURE — 99214 OFFICE O/P EST MOD 30 MIN: CPT | Performed by: FAMILY MEDICINE

## 2024-12-10 RX ORDER — ROSUVASTATIN CALCIUM 10 MG/1
10 TABLET, COATED ORAL DAILY
Qty: 90 TABLET | Refills: 3 | Status: SHIPPED | OUTPATIENT
Start: 2024-12-10

## 2024-12-10 RX ORDER — ACETAMINOPHEN 500 MG
1000 TABLET ORAL EVERY 6 HOURS PRN
COMMUNITY

## 2024-12-10 RX ORDER — IRBESARTAN 75 MG/1
75 TABLET ORAL DAILY
Qty: 90 TABLET | Refills: 3 | Status: SHIPPED | OUTPATIENT
Start: 2024-12-10

## 2024-12-10 RX ORDER — IBUPROFEN 200 MG
200 TABLET ORAL EVERY 6 HOURS PRN
COMMUNITY

## 2024-12-10 RX ORDER — OMEPRAZOLE 40 MG/1
40 CAPSULE, DELAYED RELEASE ORAL DAILY
Qty: 90 CAPSULE | Refills: 3 | Status: SHIPPED | OUTPATIENT
Start: 2024-12-10

## 2024-12-10 RX ORDER — PREGABALIN 225 MG/1
CAPSULE ORAL
Qty: 90 CAPSULE | Refills: 5 | Status: SHIPPED | OUTPATIENT
Start: 2024-12-10

## 2024-12-10 RX ORDER — CYCLOBENZAPRINE HCL 10 MG
10 TABLET ORAL 3 TIMES DAILY PRN
Qty: 90 TABLET | Refills: 3 | Status: SHIPPED | OUTPATIENT
Start: 2024-12-10

## 2024-12-10 RX ORDER — DULOXETIN HYDROCHLORIDE 60 MG/1
60 CAPSULE, DELAYED RELEASE ORAL DAILY
Qty: 90 CAPSULE | Refills: 3 | Status: SHIPPED | OUTPATIENT
Start: 2024-12-10

## 2024-12-10 NOTE — ASSESSMENT & PLAN NOTE
increase Crestor rosuvastatin 10 mg daily    Orders:    rosuvastatin (CRESTOR) 10 MG tablet; Take 1 tablet by mouth Daily.

## 2024-12-10 NOTE — ASSESSMENT & PLAN NOTE
continue current regimen except increase Ozempic to 1 mg subcu weekly.  Irbesartan 75 mg daily metformin 500 mg 2 tablets twice daily    Orders:    metFORMIN (GLUCOPHAGE) 500 MG tablet; Take 2 tablets by mouth 2 (Two) Times a Day With Meals.

## 2024-12-10 NOTE — PROGRESS NOTES
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    Most of his pain is coming from polyneuropathy in the legs which is treated with Lyrica.  We discussed other options.  Will get a referral to neurology for another opinion.    Otherwise continue treatment of hypertension hyperlipidemia diabetes increase Crestor to 10 mg daily increase Ozempic to 1 mg subcu weekly.      Objective   Vital Signs:  /84 (BP Location: Left arm, Patient Position: Sitting, Cuff Size: Adult)   Pulse 102   Wt 87.5 kg (193 lb)   SpO2 96%   BMI 29.35 kg/m²   Estimated body mass index is 29.35 kg/m² as calculated from the following:    Height as of 11/28/23: 172.7 cm (67.99\").    Weight as of this encounter: 87.5 kg (193 lb).               Physical Exam  Vitals reviewed.   Constitutional:       Appearance: He is well-developed.   HENT:      Head: Normocephalic and atraumatic.      Right Ear: Tympanic membrane and external ear normal.      Left Ear: Tympanic membrane and external ear normal.      Nose: Nose normal.   Eyes:      Conjunctiva/sclera: Conjunctivae normal.      Pupils: Pupils are equal, round, and reactive to light.   Neck:      Thyroid: No thyromegaly.      Vascular: No JVD.   Cardiovascular:      Rate and Rhythm: Normal rate and regular rhythm.      Heart sounds: Normal heart sounds.   Pulmonary:      Effort: Pulmonary effort is normal.      Breath sounds: Normal breath sounds.   Abdominal:      General: Bowel sounds are normal.      Palpations: Abdomen is soft.   Musculoskeletal:         General: Normal range of motion.      Cervical back: Normal range of motion and neck supple.   Lymphadenopathy:      Cervical: No cervical adenopathy.   Skin:     General: Skin is warm and dry.      Findings: No rash.   Neurological:      " Mental Status: He is alert and oriented to person, place, and time.      Cranial Nerves: No cranial nerve deficit.      Coordination: Coordination normal.   Psychiatric:         Behavior: Behavior normal.         Thought Content: Thought content normal.         Judgment: Judgment normal.        Result Review :    The following data was reviewed by: Usama Carter MD on 12/10/2024:  Common labs          5/29/2024    00:00 12/5/2024    10:07   Common Labs   Glucose 83  121    BUN 15  14    Creatinine 0.75  0.75    Sodium 143  145    Potassium 4.6  5.1    Chloride 102  99    Calcium 10.0  10.9    Total Protein 6.6  7.4    Albumin 4.2  4.7    Total Bilirubin 0.4  0.5    Alkaline Phosphatase 71  78    AST (SGOT) 18  26    ALT (SGPT) 12  24    WBC 5.9  14.8    Hemoglobin 14.8  16.6    Hematocrit 43.2  50.1    Platelets 160  242    Total Cholesterol 120  206    Triglycerides 183  332    HDL Cholesterol 35  42    LDL Cholesterol  55  107    Hemoglobin A1C 5.8  6.1    PSA 2.2  1.1                   Assessment and Plan     Assessment & Plan  Polyneuropathy  Continue current dose of Lyrica with referral to Dr. Orlando Luong neurology for an opinion.  Orders:    pregabalin (LYRICA) 225 MG capsule; TAKE 1 CAPSULE BY MOUTH 3 TIMES A DAY    Ambulatory Referral to Neurology    Pain in both lower extremities  Referral to neurology for an opinion  Orders:    pregabalin (LYRICA) 225 MG capsule; TAKE 1 CAPSULE BY MOUTH 3 TIMES A DAY    Ambulatory Referral to Neurology    Mixed hyperlipidemia    increase Crestor rosuvastatin 10 mg daily    Orders:    rosuvastatin (CRESTOR) 10 MG tablet; Take 1 tablet by mouth Daily.    Controlled type 2 diabetes mellitus with diabetic polyneuropathy, without long-term current use of insulin   continue current regimen except increase Ozempic to 1 mg subcu weekly.  Irbesartan 75 mg daily metformin 500 mg 2 tablets twice daily    Orders:    metFORMIN (GLUCOPHAGE) 500 MG tablet; Take 2 tablets by mouth 2 (Two)  Times a Day With Meals.    Depression, unspecified depression type      Orders:    DULoxetine (CYMBALTA) 60 MG capsule; Take 1 capsule by mouth Daily.    Annual physical exam  Amol looks pretty good today as has been good control of his diabetes and cholesterol needs to be a little better.  Blood pressure looks okay 2.  We discussed his polyneuropathy which is painful and still going on.  Get a referral to neurology.  Continue Lyrica.    Otherwise he has repetitive cerumen impaction.                 Follow Up     No follow-ups on file.  Patient was given instructions and counseling regarding his condition or for health maintenance advice. Please see specific information pulled into the AVS if appropriate.

## 2024-12-10 NOTE — ASSESSMENT & PLAN NOTE
Referral to neurology for an opinion  Orders:    pregabalin (LYRICA) 225 MG capsule; TAKE 1 CAPSULE BY MOUTH 3 TIMES A DAY    Ambulatory Referral to Neurology

## 2024-12-10 NOTE — ASSESSMENT & PLAN NOTE
Amol looks pretty good today as has been good control of his diabetes and cholesterol needs to be a little better.  Blood pressure looks okay 2.  We discussed his polyneuropathy which is painful and still going on.  Get a referral to neurology.  Continue Lyrica.    Otherwise he has repetitive cerumen impaction.

## 2025-01-08 DIAGNOSIS — G62.9 POLYNEUROPATHY: ICD-10-CM

## 2025-01-08 DIAGNOSIS — M79.604 PAIN IN BOTH LOWER EXTREMITIES: ICD-10-CM

## 2025-01-08 DIAGNOSIS — M79.605 PAIN IN BOTH LOWER EXTREMITIES: ICD-10-CM

## 2025-01-08 NOTE — TELEPHONE ENCOUNTER
Contract on file    Rx Refill Note  Requested Prescriptions     Pending Prescriptions Disp Refills    pregabalin (LYRICA) 225 MG capsule [Pharmacy Med Name: PREGABALIN 225 MG CAPSULE] 90 capsule      Sig: TAKE 1 CAPSULE BY MOUTH 3 TIMES A DAY      Last office visit with prescribing clinician: 12/10/2024   Last telemedicine visit with prescribing clinician: Visit date not found   Next office visit with prescribing clinician: 6/12/2025       Tracy Brumfield MA  01/08/25, 15:08 EST

## 2025-01-09 NOTE — TELEPHONE ENCOUNTER
Caller: CONNOR SHOEMAKER    Relationship: PATIENT    Best call back number:     What is the best time to reach you:     Who are you requesting to speak with (clinical staff, provider,  specific staff member):     Do you know the name of the person who called:     What was the call regarding: PATIENT IS CALLING IN TO CHECK ON THE STATUS OF HIS REFILL REQUEST FOR PREGABALIN.  HE WANTS TO BE CALLED BACK TO DISCUSS.

## 2025-01-10 ENCOUNTER — TELEPHONE (OUTPATIENT)
Dept: INTERNAL MEDICINE | Facility: CLINIC | Age: 60
End: 2025-01-10
Payer: COMMERCIAL

## 2025-01-10 DIAGNOSIS — M79.605 PAIN IN BOTH LOWER EXTREMITIES: ICD-10-CM

## 2025-01-10 DIAGNOSIS — M79.604 PAIN IN BOTH LOWER EXTREMITIES: ICD-10-CM

## 2025-01-10 DIAGNOSIS — G62.9 POLYNEUROPATHY: ICD-10-CM

## 2025-01-10 RX ORDER — PREGABALIN 225 MG/1
CAPSULE ORAL
Qty: 90 CAPSULE | Refills: 5 | Status: SHIPPED | OUTPATIENT
Start: 2025-01-10

## 2025-01-10 RX ORDER — PREGABALIN 225 MG/1
CAPSULE ORAL
Qty: 90 CAPSULE | Refills: 5 | OUTPATIENT
Start: 2025-01-10

## 2025-01-10 NOTE — TELEPHONE ENCOUNTER
Read/relay  I have spoken with the pt and let him know is refill is pending for Dr Carter to approve.

## 2025-01-10 NOTE — TELEPHONE ENCOUNTER
Caller: NAVIDCORRINE    Relationship: Emergency Contact    Best call back number: [phone]    What is the best time to reach you: ANYTIME     Who are you requesting to speak with (clinical staff, provider,  specific staff member): CLINICAL     What was the call regarding: PATIENTS WIFE IS REQUESTING A CALL BACK ONCE THIS MEDICATION HAS BEEN CALLED IN.     PATIENTS WIFE STATES THAT SHE IS CONCERNED WITH THE WEATHER COMING UP AND SHE WOULD LIKE TO HAVE THIS MEDICATION CALLED IN AS SOON AS POSSIBLE.     PLEASE ADVISE

## 2025-01-10 NOTE — TELEPHONE ENCOUNTER
Cathie Mota spouse to patient Brady B Nelson called office today Brady will be out of his medication for Pregabalin (Lyrica) 225 MG capsule take 1 capsule by mouth 3 times a day diagnosis Polyneuropathy G62.9 Pain in Lower Extremities M79.604,M79.605 needs called in today to be picked up by 12:00 before the snow. Please send to [facility] PHONE [phone]

## 2025-03-26 RX ORDER — OMEPRAZOLE 40 MG/1
40 CAPSULE, DELAYED RELEASE ORAL DAILY
Qty: 90 CAPSULE | Refills: 3 | Status: SHIPPED | OUTPATIENT
Start: 2025-03-26

## 2025-03-26 NOTE — TELEPHONE ENCOUNTER
"  Caller: Brady Mota \"Amol\"    Relationship: Self    Best call back number: [phone]     What medication are you requesting: omeprazole (priLOSEC) 40 MG capsule     What are your current symptoms:     How long have you been experiencing symptoms:     Have you had these symptoms before:    [x] Yes  [] No    Have you been treated for these symptoms before:   [x] Yes  [] No    If a prescription is needed, what is your preferred pharmacy and phone number: [facility] #[id number] - [facility] [address] [facility] [address] [facility] [phone] [facility] [phone] FX     Additional notes:        "

## 2025-04-15 ENCOUNTER — OFFICE VISIT (OUTPATIENT)
Dept: NEUROLOGY | Facility: CLINIC | Age: 60
End: 2025-04-15
Payer: COMMERCIAL

## 2025-04-15 ENCOUNTER — PATIENT ROUNDING (BHMG ONLY) (OUTPATIENT)
Dept: NEUROLOGY | Facility: CLINIC | Age: 60
End: 2025-04-15
Payer: COMMERCIAL

## 2025-04-15 VITALS
HEIGHT: 68 IN | OXYGEN SATURATION: 95 % | HEART RATE: 95 BPM | DIASTOLIC BLOOD PRESSURE: 74 MMHG | WEIGHT: 193 LBS | BODY MASS INDEX: 29.25 KG/M2 | SYSTOLIC BLOOD PRESSURE: 114 MMHG

## 2025-04-15 DIAGNOSIS — R41.3 MEMORY LOSS: Primary | ICD-10-CM

## 2025-04-15 DIAGNOSIS — G60.9 IDIOPATHIC PERIPHERAL NEUROPATHY: ICD-10-CM

## 2025-04-15 RX ORDER — MULTIVIT WITH MINERALS/LUTEIN
1000 TABLET ORAL DAILY
COMMUNITY

## 2025-04-15 NOTE — PROGRESS NOTES
CC: Memory loss and peripheral neuropathy    HPI:  Brady Mota is a  59 y.o.  right-handed white male who was sent for neurological consultation by Dr. Carter regarding peripheral neuropathy and memory loss.  The patient had been seen previously 7/2021 by Dr. Oneal for sleep apnea.  He had been evaluated about 7 years earlier and found to have a combination of central and obstructive sleep apnea and treatment was recommended.  Dr. Oneal obtained a repeat study which demonstrated an AHI of 37.9 consistent with severe obstructive sleep apnea and recommended treatment.  The patient states that he is not currently being treated.  However he does complain of memory loss.  Its mostly retrieval of things that he actually knows that he states the problem is which is similar to what he told Dr. Oneal.  The patient states his sleep is fragmented and he may only get about 4 hours of sleep at night for several nights in a row but is still able to function before just crashing.  He states no depression at the moment.  He is on Ozempic which makes him feel bloated and has lost about 30 pounds but this is on purpose.  The patient also tells me that he has inability to remember people's names from their face.  He did not have any problem naming animals however on animal naming in 1 minute.  He has some concerns because his father has had dementia progressing over 5 years.  He is 86 years old now.  His maternal grandmother also had dementia he states.  The patient had a previous MRI of the brain 3/2012 showing mild small vessel disease not consistent with Camargo's criteria for multiple sclerosis.    The patient's peripheral neuropathy is longstanding.  He states that it started before he was diagnosed with diabetes about 9 years ago.  He was evaluated by Dr. Josue Azar at [place] around 2012 and was seen as a second opinion by Dr. Hernandez at [facility].  Patient states that no cause was  identified.  The patient's mother also has neuropathy but not diabetes.  The details of his evaluation from around 2012 are not available to me.  Of interest the patient states that he has dry mouth and dry eyes and that he occasionally gets dizzy when he stands up.  He states no blackout spells however.  Symptomatically he has pain and numbness in his feet and balance trouble.  Some numbness in the hands.  He states he has had 2 EMGs 1 by Dr. Azar most recently back when he saw him around 2012.  These results are not available to me.  The patient is on pregabalin 225 mg 3 times daily (675 mg/day) plus duloxetine 60 mg daily.  Recent lab tests 12/5/2024 showed hemoglobin A1c 6.1%, TSH 0.879, free T41.00, vitamin B12 level >2000, folic acid >20.  Cholesterol was 206 with HDL 42  and triglycerides 332.      Past Medical History:   Diagnosis Date    Allergic     Cholelithiasis     Gallbladder removed    Colon polyp 2012    Small, removed during colonoscopy    Diabetes mellitus     Environmental allergies     GERD (gastroesophageal reflux disease)     Avoid Reflux controlled with medication    Headache     Occasional string sinus headaches    Hernia     Umbilical repair performed    HL (hearing loss)     Only if background noise is present    Hyperlipidemia     Liver disease     Fatty liver    Low back pain     Lower back    Obesity     Peptic ulceration     Small notes during upper GI around 20 years ago    Peripheral neuropathy     Pre-diabetes     Scoliosis     I think so    Shoulder injury     Current issue    Visual impairment     Need readers         Past Surgical History:   Procedure Laterality Date    ADENOIDECTOMY      CHOLECYSTECTOMY      COLONOSCOPY  2012    EYE SURGERY      Lasik    LASIK      SALIVARY GLAND SURGERY      TONSILLECTOMY      TONSILLECTOMY AND ADENOIDECTOMY, UVULOPALATOPHARYNGOPLASTY      UMBILICAL HERNIA REPAIR             Current Outpatient Medications:     acetaminophen (TYLENOL) 500  MG tablet, Take 2 tablets by mouth Every 6 (Six) Hours As Needed for Mild Pain., Disp: , Rfl:     ascorbic acid (VITAMIN C) 1000 MG tablet, Take 1 tablet by mouth Daily., Disp: , Rfl:     cyclobenzaprine (FLEXERIL) 10 MG tablet, Take 1 tablet by mouth 3 (Three) Times a Day As Needed for Muscle Spasms., Disp: 90 tablet, Rfl: 3    DULoxetine (CYMBALTA) 60 MG capsule, Take 1 capsule by mouth Daily., Disp: 90 capsule, Rfl: 3    fluticasone (FLONASE) 50 MCG/ACT nasal spray, 2 sprays into the nostril(s) as directed by provider Daily., Disp: 48 g, Rfl: 3    glucose blood test strip, Use as instructed, Disp: 100 each, Rfl: 12    ibuprofen (ADVIL,MOTRIN) 200 MG tablet, Take 1 tablet by mouth Every 6 (Six) Hours As Needed for Mild Pain., Disp: , Rfl:     irbesartan (Avapro) 75 MG tablet, Take 1 tablet by mouth Daily. For blood pressure, Disp: 90 tablet, Rfl: 3    metFORMIN (GLUCOPHAGE) 500 MG tablet, Take 2 tablets by mouth 2 (Two) Times a Day With Meals., Disp: 360 tablet, Rfl: 3    Multiple Vitamins-Minerals (PX MENS MULTIVITAMINS) tablet, Take  by mouth daily., Disp: , Rfl:     omeprazole (priLOSEC) 40 MG capsule, Take 1 capsule by mouth Daily., Disp: 90 capsule, Rfl: 3    pregabalin (LYRICA) 225 MG capsule, TAKE 1 CAPSULE BY MOUTH 3 TIMES A DAY, Disp: 90 capsule, Rfl: 5    rosuvastatin (CRESTOR) 10 MG tablet, Take 1 tablet by mouth Daily., Disp: 90 tablet, Rfl: 3    Semaglutide, 1 MG/DOSE, (OZEMPIC) 2 MG/1.5ML solution pen-injector, Inject 1 mg under the skin into the appropriate area as directed 1 (One) Time Per Week., Disp: 6 mL, Rfl: 3    lidocaine (LIDODERM) 5 %, Place 2 patches on the skin as directed by provider Daily. Remove & Discard patch within 12 hours or as directed by MD, Disp: 60 patch, Rfl: 11      Family History   Problem Relation Age of Onset    Heart disease Paternal Uncle     Cancer Maternal Grandmother         Breast Cancer    Heart disease Paternal Grandmother     Neuropathy Mother     Heart disease  "Paternal Grandfather          Social History     Socioeconomic History    Marital status:    Tobacco Use    Smoking status: Never    Smokeless tobacco: Never   Vaping Use    Vaping status: Never Used   Substance and Sexual Activity    Alcohol use: Not Currently     Comment: rarely    Drug use: Not Currently     Types: Marijuana    Sexual activity: Yes     Partners: Female     Birth control/protection: Post-menopausal         Allergies   Allergen Reactions    Amoxicillin     Codeine     Nsaids GI Intolerance    Penicillins GI Intolerance    Tramadol Other (See Comments)     Cannot take while on lyrica         Pain Scale: 3 or 4/10        ROS:  Review of Systems   Neurological:  Positive for speech difficulty (word finding), numbness and headaches (seasonal/weather).         I have reviewed and agree with the above ROS completed by the medical assistant.      Physical Exam:  Vitals:    04/15/25 1429   BP: 114/74   Pulse: 95   SpO2: 95%   Weight: 87.5 kg (193 lb)   Height: 172.7 cm (67.99\")     Orthostatic BP: Supine blood pressure 140/90; standing blood pressure 110/70 with mild lightheadedness    Body mass index is 29.35 kg/m².    Physical Exam  General: Overweight white male no acute distress  HEENT: Normocephalic no evidence of trauma.  Throat negative  Neck: Supple.  No thyromegaly.  No cervical bruits.  Heart: Regular rate and rhythm no murmurs.  No pedal edema  Extremities: Radial pulses were strong and simultaneous      Neurological Exam:   Mental Status: Awake, alert, oriented 10/10 on MMSE.  Conversant without evidence of an affective disorder, thought disorder, delusions or hallucinations.  Attention span and concentration are normal.  HCF: No aphasia, apraxia or dysarthria.  Immediate recall and delayed recall or 3/3 in 3 minutes.  Knowledge of recent events intact.  MMSE score 30/30; clock draw 4/4 and animal naming 20 animals in 1 minute.  CN: I:   II: Visual fields full without left " "inattention   III, IV, VI: Eye movements intact without nystagmus or ptosis.  Pupils equal  round and reactive to light.   V,VII: Light touch and pinprick intact all 3 divisions of V.  Facial muscles symmetrical.   VIII: Hearing intact to finger rub   IX,X: Soft palate elevates symmetrically   XI: Sternomastoid and trapezius are strong.   XII: Tongue midline without atrophy or fasciculations  Motor: Normal tone and bulk in the upper and lower extremities   Power testing: Full power in all muscles tested in the upper extremities.  There was mild weakness of ankle dorsiflexion with moderate weakness of toe dorsiflexion with all other muscles tested in the legs being strong.  Reflexes: Upper extremities: +2 diffusely        Lower extremities: +2 knee jerks trace ankle jerks        Toe signs: Downgoing bilaterally  Sensory: Light touch: Reduced in the hands and forearms.  Reduced in the legs more in the feet        Pinprick: Similar to light touch        Vibration: Reduced at the ankles        Position: Intact at the great toes    Cerebellar: Finger-to-nose: Intact           Rapid movement: Intact           Heel-to-shin: Intact  Gait and Station: Casual walk is mildly broad-based.  He can walk on toes has a little trouble walking on heels based on trouble getting the feet cocked up fully.  Tandem walk is moderately abnormal    Results:      Lab Results   Component Value Date    GLUCOSE 121 (H) 12/05/2024    BUN 14 12/05/2024    CREATININE 0.75 (L) 12/05/2024    EGFRIFNONA 106 12/07/2021    EGFRIFAFRI 123 12/07/2021    BCR 19 12/05/2024    CO2 28 12/05/2024    CALCIUM 10.9 (H) 12/05/2024    ALBUMIN 4.7 12/05/2024    AST 26 12/05/2024    ALT 24 12/05/2024       Lab Results   Component Value Date    WBC 14.8 (H) 12/05/2024    HGB 16.6 12/05/2024    HCT 50.1 12/05/2024    MCV 92 12/05/2024     12/05/2024         .No results found for: \"RPR\"      Lab Results   Component Value Date    TSH 0.879 12/05/2024         Lab " Results   Component Value Date    EJODLQID43 >2000 (H) 12/05/2024         Lab Results   Component Value Date    FOLATE >20.0 12/05/2024         Lab Results   Component Value Date    HGBA1C 6.1 (H) 12/05/2024         Lab Results   Component Value Date    GLUCOSE 121 (H) 12/05/2024    BUN 14 12/05/2024    CREATININE 0.75 (L) 12/05/2024    EGFRIFNONA 106 12/07/2021    EGFRIFAFRI 123 12/07/2021    BCR 19 12/05/2024    K 5.1 12/05/2024    CO2 28 12/05/2024    CALCIUM 10.9 (H) 12/05/2024    ALBUMIN 4.7 12/05/2024    AST 26 12/05/2024    ALT 24 12/05/2024         Lab Results   Component Value Date    WBC 14.8 (H) 12/05/2024    HGB 16.6 12/05/2024    HCT 50.1 12/05/2024    MCV 92 12/05/2024     12/05/2024             Assessment:   1.  Memory loss-the patient had a normal MMSE, clock draw and animal naming test.  Some of his memory loss may be related to his untreated sleep apnea.  He has a legitimate concern about hereditary Alzheimer's disease  2.  Idiopathic peripheral neuropathy-previously evaluated 12 to 15 years ago by Dr. Azar without a cause identified.  Patient has some features of interest with the numbness being fairly significant in the feet and hands and he has orthostasis and dry mouth/dry eye symptoms.        Plan:  1.  The patient requests more full evaluation of his memory loss since his peripheral neuropathy had been worked up before and although he is worse he is not dramatically worse.  2.  MRI of the brain without and with contrast  3.  Sed rate, RPR,  beta amyloid 42/40 ratio and ApoE genotype.  4.  Neuropsych testing  5.  Unfortunately no further suggestions regarding control of neuropathic pain.  Further investigation of his peripheral neuropathy may be considered after his cognitive evaluation is complete  6.  Follow-up with Francia Maria in 3 months      Time: 70 minutes                  Dictated utilizing Dragon dictation.

## 2025-05-29 ENCOUNTER — HOSPITAL ENCOUNTER (OUTPATIENT)
Dept: MRI IMAGING | Facility: HOSPITAL | Age: 60
Discharge: HOME | End: 2025-05-29
Admitting: PSYCHIATRY & NEUROLOGY
Payer: COMMERCIAL

## 2025-05-29 DIAGNOSIS — R41.3 MEMORY LOSS: ICD-10-CM

## 2025-05-29 PROCEDURE — 70553 MRI BRAIN STEM W/O & W/DYE: CPT

## 2025-05-29 PROCEDURE — 25510000002 GADOBENATE DIMEGLUMINE 529 MG/ML SOLUTION: Performed by: PSYCHIATRY & NEUROLOGY

## 2025-05-29 PROCEDURE — A9577 INJ MULTIHANCE: HCPCS | Performed by: PSYCHIATRY & NEUROLOGY

## 2025-05-29 RX ADMIN — GADOBENATE DIMEGLUMINE 18 ML: 529 INJECTION, SOLUTION INTRAVENOUS at 15:01

## 2025-06-02 LAB — CREAT BLDA-MCNC: 0.7 MG/DL (ref 0.6–1.3)

## 2025-07-09 DIAGNOSIS — M79.604 PAIN IN BOTH LOWER EXTREMITIES: ICD-10-CM

## 2025-07-09 DIAGNOSIS — G62.9 POLYNEUROPATHY: ICD-10-CM

## 2025-07-09 DIAGNOSIS — M79.605 PAIN IN BOTH LOWER EXTREMITIES: ICD-10-CM

## 2025-07-09 RX ORDER — PREGABALIN 225 MG/1
225 CAPSULE ORAL 3 TIMES DAILY
Qty: 90 CAPSULE | Refills: 5 | Status: SHIPPED | OUTPATIENT
Start: 2025-07-09

## 2025-07-09 NOTE — TELEPHONE ENCOUNTER
Contract on file    Rx Refill Note  Requested Prescriptions     Pending Prescriptions Disp Refills    pregabalin (LYRICA) 225 MG capsule [Pharmacy Med Name: PREGABALIN 225 MG CAPSULE] 90 capsule      Sig: TAKE 1 CAPSULE BY MOUTH 3 TIMES A DAY      Last office visit with prescribing clinician: 12/10/2024   Last telemedicine visit with prescribing clinician: Visit date not found   Next office visit with prescribing clinician: 8/18/2025       Tracy Brumfield MA  07/09/25, 10:44 EDT

## 2025-07-14 ENCOUNTER — OFFICE VISIT (OUTPATIENT)
Dept: NEUROLOGY | Facility: CLINIC | Age: 60
End: 2025-07-14
Payer: COMMERCIAL

## 2025-07-14 VITALS
OXYGEN SATURATION: 94 % | DIASTOLIC BLOOD PRESSURE: 62 MMHG | HEIGHT: 68 IN | SYSTOLIC BLOOD PRESSURE: 94 MMHG | WEIGHT: 196 LBS | BODY MASS INDEX: 29.7 KG/M2 | HEART RATE: 100 BPM

## 2025-07-14 DIAGNOSIS — R41.3 MEMORY LOSS: Primary | ICD-10-CM

## 2025-07-14 DIAGNOSIS — G60.9 IDIOPATHIC PERIPHERAL NEUROPATHY: ICD-10-CM

## 2025-07-14 PROCEDURE — 99215 OFFICE O/P EST HI 40 MIN: CPT

## 2025-07-14 PROCEDURE — 99417 PROLNG OP E/M EACH 15 MIN: CPT

## 2025-07-14 NOTE — PROGRESS NOTES
NAME: Brady Mota   : 1965    Chief Complaint   Patient presents with    Memory Loss        HPI:  Brady Mota is a 59 y.o. right-handed male who I am seeing for the first time in follow-up regarding peripheral neuropathy and memory loss.  He has a past medical history of type 2 diabetes, hyperlipidemia, GERD and PASTOR (no CPAP).  He was last seen by Dr. Luong on 4/15/2025, with the following history taken from that note with additions/modifications as indicated:    The patient had been seen previously 2021 by Dr. Oneal for sleep apnea.  He had been evaluated about 7 years earlier and found to have a combination of central and obstructive sleep apnea and treatment was recommended.  Dr. Oneal obtained a repeat study which demonstrated an AHI of 37.9 consistent with severe obstructive sleep apnea and recommended treatment.  The patient states that he is not currently being treated and is unable to tolerate CPAP.  However, he does complain of memory loss.  Its mostly retrieval of things that he actually knows.  The patient states his sleep is fragmented and he may only get about 4 hours of sleep at night for several nights in a row but is still able to function before just crashing.  He states no depression at the moment.  He is on Ozempic which makes him feel bloated and has lost about 30 pounds purposely.  The patient also reported he has inability to remember people's names from their face.  He did not have any problem naming animals however on animal naming in 1 minute.  He reports some concern of dementia as his father and maternal grandmother had dementia.  The patient had a previous MRI of the brain 3/2012 showing mild small vessel disease not consistent with Camargo's criteria for multiple sclerosis.     The patient's peripheral neuropathy is longstanding.  He states that it started before he was diagnosed with diabetes, about 9 years ago.  He was evaluated by Dr. Josue Azar at [facility]  around 2012 and was seen as a second opinion by Dr. Hernandez at [facility].  Patient states that no cause was identified.  The patient's mother also has neuropathy but not diabetes.  The details of his evaluation from around 2012 are not available in epic.  Of interest, the patient states that he has dry mouth and dry eyes and that he occasionally gets dizzy when he stands up.  He states no blackout spells.  Symptomatically, he has pain and numbness in his feet and balance trouble.  Some numbness in the hands.  He states he has had 2 EMGs 1 by Dr. Azar most recently back when he saw him around 2012.  These results are not available to me.  The patient is on pregabalin 225 mg 3 times daily (675 mg/day) plus duloxetine 60 mg daily.      Recent labs 12/5/2024 showed hemoglobin A1c 6.1%, TSH 0.879, free T4 1.00, vitamin B12 level >2000, folic acid >20.  Cholesterol was 206 with HDL 42  and triglycerides 332.    History interim    Patient here today to follow-up on labs and MRI brain results.  Unfortunately patient states he did not have his labs drawn as he was unaware of the orders.  He also states he was unaware of neuropsych evaluation.  MRI brain showed mild small vessel ischemic changes but there was no evidence of acute infarction, hydrocephalus or abnormal enhancement.    Patient denies any change in his memory since his prior visit.  He reports having a harder time concentrating and being easily distracted.  He describes word finding troubles at times.  He reports having trouble remembering people's names from their face but is able to recognize a celebrity by their voice.    As for his neuropathy, he reports numbness from the mid calves down but occasionally it ascends to just above the knees.  He reports a sunburn sensation in his hands and feet as well as numbness in the hands.  He states his feet feel like there is an electrical charge or a buzzing sensation.  He states his  neuropathy symptoms are pretty well-controlled on his current regimen of Lyrica and Cymbalta.  He reports some weakness in his legs and has little more difficulty on stairs.  He reports low back pain that occasionally shoots down both legs if he does not use a lumbar pillow while he is working.  He works from home and states he rarely leaves the house.  He states Flexeril helps some but his back pain is worse when he is sitting.  He denies any incontinence of bowel or bladder.  He reports occasional dizziness if he stands up too fast.      Past Medical History:   Diagnosis Date    Allergic     Cholelithiasis     Gallbladder removed    Colon polyp 2012    Small, removed during colonoscopy    Diabetes mellitus     Environmental allergies     GERD (gastroesophageal reflux disease)     Avoid Reflux controlled with medication    Headache     Occasional string sinus headaches    Hernia     Umbilical repair performed    HL (hearing loss)     Only if background noise is present    Hyperlipidemia     Liver disease     Fatty liver    Low back pain     Lower back    Obesity     Peptic ulceration     Small notes during upper GI around 20 years ago    Peripheral neuropathy     Pre-diabetes     Scoliosis     I think so    Shoulder injury     Current issue    Visual impairment     Need readers         Past Surgical History:   Procedure Laterality Date    ADENOIDECTOMY      CHOLECYSTECTOMY      COLONOSCOPY  2012    EYE SURGERY      Lasik    LASIK      SALIVARY GLAND SURGERY      TONSILLECTOMY      TONSILLECTOMY AND ADENOIDECTOMY, UVULOPALATOPHARYNGOPLASTY      UMBILICAL HERNIA REPAIR             Current Outpatient Medications:     acetaminophen (TYLENOL) 500 MG tablet, Take 2 tablets by mouth Every 6 (Six) Hours As Needed for Mild Pain., Disp: , Rfl:     ascorbic acid (VITAMIN C) 1000 MG tablet, Take 1 tablet by mouth Daily., Disp: , Rfl:     cyclobenzaprine (FLEXERIL) 10 MG tablet, Take 1 tablet by mouth 3 (Three) Times a Day As  Needed for Muscle Spasms., Disp: 90 tablet, Rfl: 3    DULoxetine (CYMBALTA) 60 MG capsule, Take 1 capsule by mouth Daily., Disp: 90 capsule, Rfl: 3    fluticasone (FLONASE) 50 MCG/ACT nasal spray, 2 sprays into the nostril(s) as directed by provider Daily., Disp: 48 g, Rfl: 3    glucose blood test strip, Use as instructed, Disp: 100 each, Rfl: 12    ibuprofen (ADVIL,MOTRIN) 200 MG tablet, Take 1 tablet by mouth Every 6 (Six) Hours As Needed for Mild Pain., Disp: , Rfl:     irbesartan (Avapro) 75 MG tablet, Take 1 tablet by mouth Daily. For blood pressure, Disp: 90 tablet, Rfl: 3    metFORMIN (GLUCOPHAGE) 500 MG tablet, Take 2 tablets by mouth 2 (Two) Times a Day With Meals., Disp: 360 tablet, Rfl: 3    Multiple Vitamins-Minerals (PX MENS MULTIVITAMINS) tablet, Take  by mouth daily., Disp: , Rfl:     omeprazole (priLOSEC) 40 MG capsule, Take 1 capsule by mouth Daily., Disp: 90 capsule, Rfl: 3    pregabalin (LYRICA) 225 MG capsule, TAKE 1 CAPSULE BY MOUTH 3 TIMES A DAY, Disp: 90 capsule, Rfl: 5    rosuvastatin (CRESTOR) 10 MG tablet, Take 1 tablet by mouth Daily., Disp: 90 tablet, Rfl: 3    Semaglutide, 1 MG/DOSE, (OZEMPIC) 2 MG/1.5ML solution pen-injector, Inject 1 mg under the skin into the appropriate area as directed 1 (One) Time Per Week., Disp: 6 mL, Rfl: 3      Family History   Problem Relation Age of Onset    Heart disease Paternal Uncle     Cancer Maternal Grandmother         Breast Cancer    Heart disease Paternal Grandmother     Neuropathy Mother     Heart disease Paternal Grandfather          Social History     Socioeconomic History    Marital status:    Tobacco Use    Smoking status: Never    Smokeless tobacco: Never   Vaping Use    Vaping status: Never Used   Substance and Sexual Activity    Alcohol use: Not Currently     Comment: rarely    Drug use: Not Currently     Types: Marijuana    Sexual activity: Yes     Partners: Female     Birth control/protection: Post-menopausal         Allergies  "  Allergen Reactions    Amoxicillin     Codeine     Nsaids GI Intolerance    Penicillins GI Intolerance    Tramadol Other (See Comments)     Cannot take while on lyrica         Pain Scale: 2/10 feet        ROS:  Review of Systems   Musculoskeletal:  Positive for back pain and gait problem. Negative for neck pain.   Neurological:  Positive for dizziness, weakness and numbness.   Psychiatric/Behavioral:  Positive for decreased concentration.          Physical Exam:  Vitals:    07/14/25 1458   BP: 94/62   Pulse: 100   SpO2: 94%   Weight: 88.9 kg (196 lb)   Height: 172.7 cm (67.99\")       Orthostatic BP:       Physical Exam  General: Overweight white male no acute distress  HEENT: Normocephalic no evidence of trauma  Neck: Supple.    Heart: Regular rate and rhythm  Extremities: Radial pulses strong and simultaneous.        Neurological Exam:   Mental Status: Awake, alert, oriented to person, place and time.  Conversant without evidence of an affective disorder, thought disorder, delusions or hallucinations.  Attention span and concentration are normal.  HCF: No aphasia, apraxia or dysarthria.  Recent and remote memory intact.  Knowledge of recent events intact.  CN: I:   II: Visual fields full without left inattention   III, IV, VI: Eye movements intact without nystagmus or ptosis.  Pupils equal round and reactive to light.   V,VII: Light touch and pinprick intact all 3 divisions of V.  Facial muscles symmetrical.   VIII: Hearing intact to finger rub   IX,X: Soft palate elevates symmetrically   XI: Sternomastoid and trapezius are strong.   XII: Tongue midline without atrophy or fasciculations    Motor: Normal tone and bulk in the upper and lower extremities.    Power testing: Mild weakness of ankle dorsiflexion with moderate weakness of toe dorsiflexion but good strength in all other muscles tested in the arms and legs.    Reflexes: Upper extremities: +2 diffusely        Lower extremities: +2 knee jerks trace ankle " jerks        Toe signs:        Clonus:     Sensory: Light touch: Reduced in the hands and mid calves down        Pinprick: Patchy reduction in the arms, reduced mid calves down        Vibration: Reduced at the ankles        Position:        Temperature:    Cerebellar: Finger-to-nose: Normal           Rapid movement: Normal           Heel-to-shin:    Gait and Station: Comes to stand without difficulty.  Mildly broad-based.  Able to walk on toes little more difficulty walking on heels.  Abnormal tandem walk.  Slight swaying with Romberg.  No drift.      Results:    Lab Results   Component Value Date    GLUCOSE 121 (H) 12/05/2024    BUN 14 12/05/2024    CREATININE 0.70 05/29/2025    EGFRIFNONA 106 12/07/2021    EGFRIFAFRI 123 12/07/2021    BCR 19 12/05/2024    CO2 28 12/05/2024    CALCIUM 10.9 (H) 12/05/2024    ALBUMIN 4.7 12/05/2024    AST 26 12/05/2024    ALT 24 12/05/2024     Lab Results   Component Value Date    WBC 14.8 (H) 12/05/2024    HGB 16.6 12/05/2024    HCT 50.1 12/05/2024    MCV 92 12/05/2024     12/05/2024     Lab Results   Component Value Date    TSH 0.879 12/05/2024     Lab Results   Component Value Date    [id number] >2000 (H) 12/05/2024     Lab Results   Component Value Date    FOLATE >20.0 12/05/2024     Lab Results   Component Value Date    HGBA1C 6.1 (H) 12/05/2024         Assessment:    1.  Memory loss-MRI brain shows mild small vessel ischemic changes.  Patient was unaware of neuropsych evaluation or labs.  Patient states he will get labs at PCP appointment next month.  Untreated sleep apnea likely contributing to memory loss.  2.  Idiopathic peripheral neuropathy-reviewing chart notes looks like patient was diagnosed with amyloidosis in 2012 per fat pad biopsy but he states he followed up with [facility] and he was told it was a false positive and he did not have amyloidosis.  Patient has some orthostasis, dry eye and dry mouth.           Assessment and Plan   Diagnoses and all  orders for this visit:    1. Memory loss (Primary)  -     Ambulatory Referral to Neuropsychology    2. Idiopathic peripheral neuropathy        Ideal targets for risk factor control would be Blood pressure < 130/80, B12 > 500, TSH in normal range and LDL < 70; HbA1c < 6.5 and smoking cessation if applicable. Call 911 for stroke symptoms.  Recommend 150 minutes of physical activity weekly.  Limit alcohol intake.  Obtain labs ordered by Dr. Luong at prior visit.  Patient states he will will have drawn at PCP appointment next month.  He needs; Amyloid beta 42/40 ratio, APOE Alzheimer's risk, RPR and sed rate.  Referral to neuropsych evaluation.  Told patient if he does not hear from [facility] in a couple weeks to call their office.  I sent a [employer] message with the office information.  Check genetic testing through [facility] for  hATTR amyloidosis.  Patient signed genetic testing consent in office.  Obtain previous neurology notes from [facility] and [facility]  Follow-up in 2 to 3 months or sooner if needed      Time: Spent 75 minutes in total encounter time. This included time for chart review, obtaining history, performing pertinent physical examination, updating medical information, ordering tests/referrals, discussion of diagnosis, medical management, counseling, and encounter documentation.        HARSH Fisher          Much of this encounter note was dictated utilizing Dragon dictation which is an electronic transcription/translation of spoken language to printed text. The electronic translation of spoken language may permit erroneous, or at times, nonsensical words or phrases to be inadvertently transcribed; Although I have reviewed the note for such errors, some may still exist.    Portions of this assessment have been copied from previous documentation which has been thoroughly reviewed and updated as appropriate.

## 2025-08-13 DIAGNOSIS — E55.9 VITAMIN D DEFICIENCY: ICD-10-CM

## 2025-08-13 DIAGNOSIS — Z12.5 PROSTATE CANCER SCREENING: Primary | ICD-10-CM

## 2025-08-13 DIAGNOSIS — I10 PRIMARY HYPERTENSION: ICD-10-CM

## 2025-08-13 DIAGNOSIS — E53.8 B12 DEFICIENCY: ICD-10-CM

## 2025-08-13 DIAGNOSIS — E78.2 MIXED HYPERLIPIDEMIA: ICD-10-CM

## 2025-08-13 DIAGNOSIS — K76.0 HEPATIC STEATOSIS: ICD-10-CM

## 2025-08-13 DIAGNOSIS — G62.9 POLYNEUROPATHY: ICD-10-CM

## 2025-08-13 DIAGNOSIS — E11.42 CONTROLLED TYPE 2 DIABETES MELLITUS WITH DIABETIC POLYNEUROPATHY, WITHOUT LONG-TERM CURRENT USE OF INSULIN: ICD-10-CM

## 2025-08-13 DIAGNOSIS — Z12.5 PROSTATE CANCER SCREENING: ICD-10-CM

## 2025-08-14 ENCOUNTER — LAB (OUTPATIENT)
Facility: HOSPITAL | Age: 60
End: 2025-08-14
Payer: COMMERCIAL

## 2025-08-14 LAB
25(OH)D3 SERPL-MCNC: 31 NG/ML (ref 30–100)
ALBUMIN SERPL-MCNC: 4.4 G/DL (ref 3.5–5.2)
ALBUMIN/GLOB SERPL: 1.6 G/DL
ALP SERPL-CCNC: 65 U/L (ref 39–117)
ALT SERPL W P-5'-P-CCNC: 19 U/L (ref 1–41)
ANION GAP SERPL CALCULATED.3IONS-SCNC: 12.3 MMOL/L (ref 5–15)
AST SERPL-CCNC: 21 U/L (ref 1–40)
BASOPHILS # BLD AUTO: 0.02 10*3/MM3 (ref 0–0.2)
BASOPHILS NFR BLD AUTO: 0.3 % (ref 0–1.5)
BILIRUB SERPL-MCNC: 0.4 MG/DL (ref 0–1.2)
BILIRUB UR QL STRIP: NEGATIVE
BUN SERPL-MCNC: 16 MG/DL (ref 8–23)
BUN/CREAT SERPL: 24.2 (ref 7–25)
CALCIUM SPEC-SCNC: 9.6 MG/DL (ref 8.6–10.5)
CHLORIDE SERPL-SCNC: 102 MMOL/L (ref 98–107)
CHOLEST SERPL-MCNC: 146 MG/DL (ref 0–200)
CLARITY UR: CLEAR
CO2 SERPL-SCNC: 27.7 MMOL/L (ref 22–29)
COLOR UR: YELLOW
CREAT SERPL-MCNC: 0.66 MG/DL (ref 0.76–1.27)
DEPRECATED RDW RBC AUTO: 40.9 FL (ref 37–54)
EGFRCR SERPLBLD CKD-EPI 2021: 107.4 ML/MIN/1.73
EOSINOPHIL # BLD AUTO: 0.37 10*3/MM3 (ref 0–0.4)
EOSINOPHIL NFR BLD AUTO: 5.9 % (ref 0.3–6.2)
ERYTHROCYTE [DISTWIDTH] IN BLOOD BY AUTOMATED COUNT: 12.4 % (ref 12.3–15.4)
ERYTHROCYTE [SEDIMENTATION RATE] IN BLOOD: 1 MM/HR (ref 0–20)
FOLATE SERPL-MCNC: >20 NG/ML (ref 4.78–24.2)
GLOBULIN UR ELPH-MCNC: 2.7 GM/DL
GLUCOSE SERPL-MCNC: 108 MG/DL (ref 65–99)
GLUCOSE UR STRIP-MCNC: NEGATIVE MG/DL
HBA1C MFR BLD: 5.8 % (ref 4.8–5.6)
HCT VFR BLD AUTO: 43.8 % (ref 37.5–51)
HDLC SERPL QL: 4.06
HDLC SERPL-MCNC: 36 MG/DL (ref 40–60)
HGB BLD-MCNC: 15.3 G/DL (ref 13–17.7)
HGB UR QL STRIP.AUTO: NEGATIVE
IMM GRANULOCYTES # BLD AUTO: 0.01 10*3/MM3 (ref 0–0.05)
IMM GRANULOCYTES NFR BLD AUTO: 0.2 % (ref 0–0.5)
KETONES UR QL STRIP: NEGATIVE
LDLC SERPL CALC-MCNC: 78 MG/DL (ref 0–100)
LEUKOCYTE ESTERASE UR QL STRIP.AUTO: NEGATIVE
LYMPHOCYTES # BLD AUTO: 2.15 10*3/MM3 (ref 0.7–3.1)
LYMPHOCYTES NFR BLD AUTO: 34.4 % (ref 19.6–45.3)
MCH RBC QN AUTO: 31.5 PG (ref 26.6–33)
MCHC RBC AUTO-ENTMCNC: 34.9 G/DL (ref 31.5–35.7)
MCV RBC AUTO: 90.1 FL (ref 79–97)
MONOCYTES # BLD AUTO: 0.56 10*3/MM3 (ref 0.1–0.9)
MONOCYTES NFR BLD AUTO: 9 % (ref 5–12)
NEUTROPHILS NFR BLD AUTO: 3.14 10*3/MM3 (ref 1.7–7)
NEUTROPHILS NFR BLD AUTO: 50.2 % (ref 42.7–76)
NITRITE UR QL STRIP: NEGATIVE
NRBC BLD AUTO-RTO: 0 /100 WBC (ref 0–0.2)
PH UR STRIP.AUTO: 6 [PH] (ref 5–8)
PLATELET # BLD AUTO: 172 10*3/MM3 (ref 140–450)
PMV BLD AUTO: 9.9 FL (ref 6–12)
POTASSIUM SERPL-SCNC: 5.2 MMOL/L (ref 3.5–5.2)
PROT SERPL-MCNC: 7.1 G/DL (ref 6–8.5)
PROT UR QL STRIP: NEGATIVE
PSA SERPL-MCNC: 1.35 NG/ML (ref 0–4)
RBC # BLD AUTO: 4.86 10*6/MM3 (ref 4.14–5.8)
RPR SER QL: NORMAL
SODIUM SERPL-SCNC: 142 MMOL/L (ref 136–145)
SP GR UR STRIP: 1.02 (ref 1–1.03)
T3FREE SERPL-MCNC: 2.99 PG/ML (ref 2–4.4)
T4 FREE SERPL-MCNC: 0.91 NG/DL (ref 0.92–1.68)
TRIGL SERPL-MCNC: 189 MG/DL (ref 0–150)
TSH SERPL DL<=0.05 MIU/L-ACNC: 0.96 UIU/ML (ref 0.27–4.2)
UROBILINOGEN UR QL STRIP: NORMAL
VIT B12 BLD-MCNC: 1046 PG/ML (ref 211–946)
VLDLC SERPL-MCNC: 32 MG/DL (ref 5–40)
WBC NRBC COR # BLD AUTO: 6.25 10*3/MM3 (ref 3.4–10.8)

## 2025-08-14 PROCEDURE — 80061 LIPID PANEL: CPT | Performed by: FAMILY MEDICINE

## 2025-08-14 PROCEDURE — 82607 VITAMIN B-12: CPT | Performed by: FAMILY MEDICINE

## 2025-08-14 PROCEDURE — 36415 COLL VENOUS BLD VENIPUNCTURE: CPT

## 2025-08-14 PROCEDURE — 86592 SYPHILIS TEST NON-TREP QUAL: CPT | Performed by: PSYCHIATRY & NEUROLOGY

## 2025-08-14 PROCEDURE — 82306 VITAMIN D 25 HYDROXY: CPT | Performed by: FAMILY MEDICINE

## 2025-08-14 PROCEDURE — G0103 PSA SCREENING: HCPCS | Performed by: FAMILY MEDICINE

## 2025-08-14 PROCEDURE — 83036 HEMOGLOBIN GLYCOSYLATED A1C: CPT | Performed by: FAMILY MEDICINE

## 2025-08-14 PROCEDURE — 82746 ASSAY OF FOLIC ACID SERUM: CPT | Performed by: FAMILY MEDICINE

## 2025-08-14 PROCEDURE — 84481 FREE ASSAY (FT-3): CPT | Performed by: FAMILY MEDICINE

## 2025-08-14 PROCEDURE — 81003 URINALYSIS AUTO W/O SCOPE: CPT | Performed by: FAMILY MEDICINE

## 2025-08-14 PROCEDURE — 84439 ASSAY OF FREE THYROXINE: CPT | Performed by: FAMILY MEDICINE

## 2025-08-14 PROCEDURE — 84443 ASSAY THYROID STIM HORMONE: CPT | Performed by: FAMILY MEDICINE

## 2025-08-14 PROCEDURE — 85025 COMPLETE CBC W/AUTO DIFF WBC: CPT

## 2025-08-14 PROCEDURE — 80053 COMPREHEN METABOLIC PANEL: CPT | Performed by: FAMILY MEDICINE

## 2025-08-14 PROCEDURE — 85652 RBC SED RATE AUTOMATED: CPT | Performed by: PSYCHIATRY & NEUROLOGY

## 2025-08-18 ENCOUNTER — OFFICE VISIT (OUTPATIENT)
Dept: INTERNAL MEDICINE | Facility: CLINIC | Age: 60
End: 2025-08-18
Payer: COMMERCIAL

## 2025-08-18 VITALS
RESPIRATION RATE: 18 BRPM | HEIGHT: 67 IN | BODY MASS INDEX: 29.82 KG/M2 | HEART RATE: 115 BPM | WEIGHT: 190 LBS | DIASTOLIC BLOOD PRESSURE: 82 MMHG | OXYGEN SATURATION: 95 % | SYSTOLIC BLOOD PRESSURE: 130 MMHG

## 2025-08-18 DIAGNOSIS — M79.605 PAIN IN BOTH LOWER EXTREMITIES: ICD-10-CM

## 2025-08-18 DIAGNOSIS — E11.42 CONTROLLED TYPE 2 DIABETES MELLITUS WITH DIABETIC POLYNEUROPATHY, WITHOUT LONG-TERM CURRENT USE OF INSULIN: ICD-10-CM

## 2025-08-18 DIAGNOSIS — E53.8 B12 DEFICIENCY: Primary | ICD-10-CM

## 2025-08-18 DIAGNOSIS — M79.604 PAIN IN BOTH LOWER EXTREMITIES: ICD-10-CM

## 2025-08-18 DIAGNOSIS — F32.A DEPRESSION, UNSPECIFIED DEPRESSION TYPE: ICD-10-CM

## 2025-08-18 DIAGNOSIS — E78.2 MIXED HYPERLIPIDEMIA: ICD-10-CM

## 2025-08-18 DIAGNOSIS — G62.9 POLYNEUROPATHY: ICD-10-CM

## 2025-08-18 PROCEDURE — 99214 OFFICE O/P EST MOD 30 MIN: CPT | Performed by: FAMILY MEDICINE

## 2025-08-18 RX ORDER — CYCLOBENZAPRINE HCL 10 MG
10 TABLET ORAL 3 TIMES DAILY PRN
Qty: 90 TABLET | Refills: 3 | Status: SHIPPED | OUTPATIENT
Start: 2025-08-18

## 2025-08-18 RX ORDER — ROSUVASTATIN CALCIUM 10 MG/1
10 TABLET, COATED ORAL DAILY
Qty: 90 TABLET | Refills: 3 | Status: SHIPPED | OUTPATIENT
Start: 2025-08-18

## 2025-08-18 RX ORDER — FLUTICASONE PROPIONATE 50 MCG
2 SPRAY, SUSPENSION (ML) NASAL DAILY
Qty: 48 G | Refills: 3 | Status: SHIPPED | OUTPATIENT
Start: 2025-08-18

## 2025-08-18 RX ORDER — IRBESARTAN 75 MG/1
75 TABLET ORAL DAILY
Qty: 90 TABLET | Refills: 3 | Status: SHIPPED | OUTPATIENT
Start: 2025-08-18

## 2025-08-18 RX ORDER — DULOXETIN HYDROCHLORIDE 60 MG/1
60 CAPSULE, DELAYED RELEASE ORAL DAILY
Qty: 90 CAPSULE | Refills: 3 | Status: SHIPPED | OUTPATIENT
Start: 2025-08-18

## 2025-08-18 RX ORDER — OMEPRAZOLE 40 MG/1
40 CAPSULE, DELAYED RELEASE ORAL DAILY
Qty: 90 CAPSULE | Refills: 3 | Status: SHIPPED | OUTPATIENT
Start: 2025-08-18

## 2025-08-18 RX ORDER — PREGABALIN 225 MG/1
225 CAPSULE ORAL 3 TIMES DAILY
Qty: 90 CAPSULE | Refills: 5 | Status: SHIPPED | OUTPATIENT
Start: 2025-08-18